# Patient Record
Sex: FEMALE | Race: WHITE | Employment: FULL TIME | URBAN - METROPOLITAN AREA
[De-identification: names, ages, dates, MRNs, and addresses within clinical notes are randomized per-mention and may not be internally consistent; named-entity substitution may affect disease eponyms.]

---

## 2017-12-12 ENCOUNTER — HOSPITAL ENCOUNTER (OUTPATIENT)
Dept: LAB | Age: 42
Discharge: HOME | End: 2017-12-12
Payer: COMMERCIAL

## 2017-12-12 PROCEDURE — 87624 HPV HI-RISK TYP POOLED RSLT: CPT | Performed by: PHYSICIAN ASSISTANT

## 2017-12-12 PROCEDURE — 88175 CYTOPATH C/V AUTO FLUID REDO: CPT | Performed by: PHYSICIAN ASSISTANT

## 2017-12-28 ENCOUNTER — HOSPITAL ENCOUNTER (OUTPATIENT)
Dept: MAMMOGRAPHY | Age: 42
Discharge: HOME | End: 2017-12-28
Attending: FAMILY MEDICINE
Payer: COMMERCIAL

## 2017-12-28 ENCOUNTER — HOSPITAL ENCOUNTER (OUTPATIENT)
Dept: ULTRASOUND IMAGING | Age: 42
Discharge: HOME | End: 2017-12-28
Attending: FAMILY MEDICINE
Payer: COMMERCIAL

## 2017-12-28 DIAGNOSIS — N60.49: ICD-10-CM

## 2017-12-28 DIAGNOSIS — Z12.39 ENCOUNTER FOR OTHER SCREENING FOR MALIGNANT NEOPLASM OF BREAST: ICD-10-CM

## 2017-12-28 PROCEDURE — 76642 ULTRASOUND BREAST LIMITED: CPT

## 2017-12-28 PROCEDURE — 77062 BREAST TOMOSYNTHESIS BI: CPT

## 2018-01-16 ENCOUNTER — OFFICE VISIT (OUTPATIENT)
Dept: SURGERY | Age: 43
End: 2018-01-16

## 2018-01-16 VITALS
WEIGHT: 222 LBS | HEIGHT: 64 IN | SYSTOLIC BLOOD PRESSURE: 136 MMHG | BODY MASS INDEX: 37.9 KG/M2 | DIASTOLIC BLOOD PRESSURE: 90 MMHG | TEMPERATURE: 98.3 F | HEART RATE: 73 BPM | RESPIRATION RATE: 16 BRPM | OXYGEN SATURATION: 97 %

## 2018-01-16 DIAGNOSIS — J45.20 MILD INTERMITTENT ASTHMA, UNSPECIFIED WHETHER COMPLICATED: ICD-10-CM

## 2018-01-16 DIAGNOSIS — N63.20 BREAST MASS, LEFT: Primary | ICD-10-CM

## 2018-01-16 DIAGNOSIS — N64.3 GALACTORRHEA IN FEMALE: ICD-10-CM

## 2018-01-16 NOTE — PATIENT INSTRUCTIONS
If you have any questions or concerns about today's appointment, the verbal and/or written instructions you were given for follow up care, please call our office at [phone]. [facility] - [facility]  [address] [place] [address] [address] [place] [address], [facility]    [phone] office  [phone] fax    PATIENT PRE AND POST [address]   [address]  [phone]    Before Surgery Instructions:   1) You must have someone available to drive you to and from your procedure and stay with you for the first 24 hours. 2) It is very important that you have nothing to eat or drink after midnight the night before your surgery. This includes chewing gum or sucking on hard candy. Take only heart, blood pressure and cholesterol medications the morning of surgery with only a sip of water. 3) Please stop taking Plavix 10 days prior to your surgery. Stop taking Coumadin 5 days prior to your surgery. Stop taking all Aspirin or Aspirin containing products 7 days prior to your surgery. Stop taking Advil, Motrin, Aleve, and etc. 3 days prior to your surgery. 4) If you take any diabetic medications please consult with your primary care physician on how to take them on the day of your surgery  5) Please stop all Herbal products 2 weeks prior to your surgery. 6) Please arrive at the hospital 2 hours prior to your surgery, unless you have been otherwise instructed. 7) Patients having an operation on their colon will be given a separate instruction sheet on their Bowel Prep. 8) For any pre-operative work up check in at the main entrance to [facility], and then go to Patient Registration. These studies are done on a walk in basis they are open from 7:00am to 5:00pm Monday through Friday. 9) Please wash your surgical site the morning of your surgery with soap and water.   10) If you are of child bearing age you will have pregnancy test done the morning of your surgery as soon as you arrive. 11) You may be contacted to change your surgery time. At times this is necessary due to equipment or staffing needs. After Surgery Instructions: You will need to be seen in the office for a follow-up visit 7-14 days after your surgery. Please call after you have had the procedure to make this appointment. Unless otherwise instructed, you may remove your outer bandage and shower 48 hours after your surgery. If you develop a fever greater than 101, have any significant drainage, bleeding, swelling and/or pus of the wound. Please call our office immediately. Surgery Date and Time:                                                                      Please check in at [facility], enter through the Emergency Room entrance and go up to the second floor. Please check in by         the day of your surgery. You may contact Jyoti Miranda with any questions at [phone].

## 2018-01-16 NOTE — MR AVS SNAPSHOT
[address] [facility] [address] [address] 
[phone] Patient: Anderson Guerra MRN: [id number] :1975 Visit Information Date & Time Provider Department Dept. Phone Encounter #  
 2018  3:00 PM Thomas Hidalgo MD [address] [id number] Upcoming Health Maintenance Date Due Pneumococcal 19-64 Medium Risk (1 of 1 - PPSV23) 1994 DTaP/Tdap/Td series (1 - Tdap) 1996 Influenza Age 5 to Adult 2017 PAP AKA CERVICAL CYTOLOGY 2020 Allergies as of 2018  Review Complete On: 2018 By: Thomas Hidalgo MD  
  
 Severity Noted Reaction Type Reactions Bactrim [Sulfamethoprim Ds]  12/10/2013    Nausea and Vomiting Current Immunizations  Never Reviewed No immunizations on file. Not reviewed this visit You Were Diagnosed With   
  
 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  
 38.11 kg/m 2 2.13 m 2 Preferred Pharmacy Pharmacy Name Phone [address], [address] [place] [address] Ave [phone] Your Updated Medication List  
  
   
This list is accurate as of: 18  4:39 PM.  Always use your most recent med list.  
  
  
  
  
 ALBUTEROL IN Take  by inhalation as needed. calcium 500 mg Tab Take  by mouth. CRANBERRY CONCENTRATE Cap Generic drug:  vitamin c-vitamin e Take  by mouth. HYDROcodone-acetaminophen 5-325 mg per tablet Commonly known as:  Irina Punt Take 1 Tab by mouth every six (6) hours as needed for Pain. ibuprofen 600 mg tablet Commonly known as:  MOTRIN Take 1 Tab by mouth every six (6) hours as needed for Pain.  
  
 levothyroxine 100 mcg tablet Commonly known as:  SYNTHROID Take  by mouth daily (before breakfast). multivitamin tablet Commonly known as:  ONE A DAY Take 1 Tab by mouth daily. NORVASC 5 mg tablet Generic drug:  amLODIPine Take 5 mg by mouth daily. Indications: HYPERTENSION PriLOSEC 20 mg capsule Generic drug:  omeprazole Take 20 mg by mouth daily. Indications: HEARTBURN  
  
 PROBIOTIC 4X 10-15 mg Tbec Generic drug:  B.infantis-B.ani-B.long-B.bifi Take  by mouth. psyllium Powd Commonly known as:  METAMUCIL Take  by mouth. Patient Instructions If you have any questions or concerns about today's appointment, the verbal and/or written instructions you were given for follow up care, please call our office at [phone]. [facility] - [facility] [id number] [place] [address] 
 
[phone] office [phone] fax PATIENT PRE AND POST OPERATIVE INSTRUCTIONS [address] 
[phone] Before Surgery Instructions:  
1) You must have someone available to drive you to and from your procedure and stay with you for the first 24 hours. 2) It is very important that you have nothing to eat or drink after midnight the night before your surgery. This includes chewing gum or sucking on hard candy. Take only heart, blood pressure and cholesterol medications the morning of surgery with only a sip of water. 3) Please stop taking Plavix 10 days prior to your surgery.  Stop taking Coumadin 5 days prior to your surgery. Stop taking all Aspirin or Aspirin containing products 7 days prior to your surgery. Stop taking Advil, Motrin, Aleve, and etc. 3 days prior to your surgery. 4) If you take any diabetic medications please consult with your primary care physician on how to take them on the day of your surgery 5) Please stop all Herbal products 2 weeks prior to your surgery. 6) Please arrive at the hospital 2 hours prior to your surgery, unless you have been otherwise instructed. 7) Patients having an operation on their colon will be given a separate instruction sheet on their Bowel Prep. 8) For any pre-operative work up check in at the main entrance to [facility], and then go to Patient Registration. These studies are done on a walk in basis they are open from 7:00am to 5:00pm Monday through Friday. 9) Please wash your surgical site the morning of your surgery with soap and water. 10) If you are of child bearing age you will have pregnancy test done the morning of your surgery as soon as you arrive. 11) You may be contacted to change your surgery time. At times this is necessary due to equipment or staffing needs. After Surgery Instructions: You will need to be seen in the office for a follow-up visit 7-14 days after your surgery. Please call after you have had the procedure to make this appointment. Unless otherwise instructed, you may remove your outer bandage and shower 48 hours after your surgery. If you develop a fever greater than 101, have any significant drainage, bleeding, swelling and/or pus of the wound. Please call our office immediately. Surgery Date and Time:                                                                   
 
Please check in at [facility], enter through the Emergency Room entrance and go up to the second floor. Please check in by         the day of your surgery. You may contact Sada Beauchamp with any questions at [phone]. Introducing \[facility] [place] [facility]\"" & HEALTH SERVICES! Diana Lim introduces [employer] patient portal. Now you can access parts of your medical record, email your doctor's office, and request medication refills online. 1. In your internet browser, go to [email or web address] 2. Click on the First Time User? Click Here link in the Sign In box. You will see the New Member Sign Up page. 3. Enter your [employer] Access Code exactly as it appears below. You will not need to use this code after youve completed the sign-up process. If you do not sign up before the expiration date, you must request a new code. · [employer] Access Code: [email or web address] Expires: 3/13/2018 12:47 PM 
 
4. Enter the last four digits of your Social Security Number (xxxx) and Date of Birth (mm/dd/yyyy) as indicated and click Submit. You will be taken to the next sign-up page. 5. Create a [employer] ID. This will be your [employer] login ID and cannot be changed, so think of one that is secure and easy to remember. 6. Create a [employer] password. You can change your password at any time. 7. Enter your Password Reset Question and Answer. This can be used at a later time if you forget your password. 8. Enter your e-mail address. You will receive e-mail notification when new information is available in [address] 9. Click Sign Up. You can now view and download portions of your medical record. 10. Click the Download Summary menu link to download a portable copy of your medical information. If you have questions, please visit the Frequently Asked Questions section of the [employer] website. Remember, [employer] is NOT to be used for urgent needs. For medical emergencies, dial 911. Now available from your iPhone and Android! Please provide this summary of care documentation to your next provider. Your primary care clinician is listed as Gerry Barkley.  If you have any questions after today's visit, please call [phone].

## 2018-01-16 NOTE — PROGRESS NOTES
Daniel Cast is a 43 y.o. female who presents for a surgical evaluation of a painful left breast lump. Last mammogram: 17    Breast pain? Yes, left breast    Do you do self breast exams? Yes    Do you feel any abnormal areas on your breast(s)? Yes, left breast lump    Do you have any nipple discharge/drainage? Color? Yes, bilateral nipples- milky, cloudy    Any discoloration of the skin on/around breast? No    Consume caffeine? Yes                                           How much? 20 oz daily    Menarche age: 15 years    When was your last menstrual cycle? 2013    Start of menopause? Yes    Birth control:  Yes                       How long? 2 years                                            Type: BC pills, Nuva ring    : 0                         Para:  0                          Abortions: 0    Age of first pregnancy: N/A    Breast feed? N/A                        Months total: N/A    Fam hx of breast ca? No                       Relation:  N/A                            Age dx: N/A    Fam: hx of ovarian ca? No                     Relation:  N/A                            Age dx: N/A    Pt hx or breast or ovarian ca? No                                     Age of dx?  N/A

## 2018-01-17 NOTE — PROGRESS NOTES
History of present illness    Sirisha Dickinson came to the office today for evaluation of a recently found breast mass on mammogram.  The patient had been complaining of some discomfort in the subareolar area of the left breast.  A few weeks ago she had developed a large swollen tender area around the nipple areolar area of the left breast.  She was placed on Keflex she believes and the redness and tenderness totally subsided at that time. A recent mammogram was ordered. Nothing was seen in the area of tenderness. Some slight ductal dilatation was noted. The patient states that for many years she has had bilateral clear discharge from both nipples. She notices that she develops a tightness or full feeling in the nipple areolar areas and squeezes and then notices drainage which then relieves some of the pressure she experiences in the nipple. She does this almost every day. Incidentally on the mammogram and ultrasound she recently had there did appear to be at the 3 o'clock position of the left breast about 7 cm from the nipple a 1.7 cm solid mass that looked suggestive of a fibroadenoma. The patient has not had any significant previous breast problems other than as mentioned above. There is no family history of breast or ovarian cancer but the patient's mother was adopted so she does not know that family history. Patient had a hysterectomy in 1772 and is uncertain whether or not her ovaries were removed but apparently she had a fibroid. Patient's menarche has started the at the age of 15. She is  0 and took birth control pills for about 2 years.     Allergies   Allergen Reactions    Bactrim [Sulfamethoprim Ds] Nausea and Vomiting     Past Medical History:   Diagnosis Date    Abnormal uterine bleeding     Abscess     skin abcesses    Asthma     Chronic cholecystitis 2010    Dysmenorrhea 2013    Endocrine disease     hypothyroidism    Fibroids 2013    Gastrointestinal disorder     acid reflux    Hepatitis     Hypertension 10/2013    Menometrorrhagia 8/14/2013    Pelvic pain in female 8/14/2013    Pre-op evaluation 12/9/2013    S/P laparoscopic hysterectomy 2/21/2014    Thyroid disorder      Past Surgical History:   Procedure Laterality Date    HX CHOLECYSTECTOMY  02/23/2010    with intraoperative cholangiogram    HX HYSTERECTOMY       Social History     Social History    Marital status: SINGLE     Spouse name: N/A    Number of children: N/A    Years of education: N/A     Social History Main Topics    Smoking status: Never Smoker    Smokeless tobacco: Never Used    Alcohol use No    Drug use: No    Sexual activity: Yes     Partners: Male     Birth control/ protection: None     Other Topics Concern    None     Social History Narrative     REVIEW OF SYSTEMS     Constitutional: No fever, weight loss, fatigue or recent chills. Skin:  No recent rashes, dermatitis or abnormal moles. HEENT:  No changes in vision, vertigo, epistaxis, dysphasia, or hoarseness. Cardiac:  No chest pain, palpitations, or edema. History of hypertension     Respiratory: No chronic cough, shortness of breath, wheezing, hemoptysis, or history of sleep apnea. History of asthma     Breasts/GYN:  No history of recent breast lumps or nipple discharge. Mammograms are up to date. No GYN-type problems. See the history of present illness     Gastrointestinal:  No significant food intolerances, no recent vomiting, no chronic abdominal pain, no change in bowel habits, no melena. History of GERD. Genitourinary:  No history of hematuria, dysuria, frequency, or stress urinary incontinence. No nocturia. Musculoskeletal: No weakness, joint pains, or arthritis. Endocrine:  History of thyroid disease. No diabetes. Lymph/hemo:  No history of blood transfusions or easy bruising. No anemia. Neuro: No dizziness or headaches or fainting.     PHYSICAL EXAM    Visit Vitals    /90 (BP 1 Location: Left arm, BP Patient Position: Sitting)    Pulse 73    Temp 98.3 °F (36.8 °C) (Oral)    Resp 16    Ht 5' 4\" (1.626 m)    Wt 100.7 kg (222 lb)    LMP 11/25/2013    SpO2 97%    BMI 38.11 kg/m2          Constitutional:  Well-developed, well-nourished, no acute distress. Head:  Head, eyes, ears, nose, throat within normal limits. Skin:  No suspicious moles or rashes. Neck:  No masses or adenopathy. The airway appears normal. Thyroid is not enlarged and there are no palpable thyroid nodules. Lungs:  Lungs are clear to auscultation and percussion. No respiratory distress. No chest wall tenderness. Heart:  Heart is regular with no extra heart sounds or murmur heard. Breast Exam: The breasts are free of any discrete tenderness or masses except just left of the nipple areolar complex at 3:00 for the left breast.  The skin and nipple areas appear normal. Both axillae are negative. I can feel a slightly tender about 2 cm mass that is poorly defined about 7 cm from the nipple in the 3 o'clock position of the left breast.     Abdomen: The abdomen is soft and nontender without organomegaly or masses. Bowel sounds are active and of normal pitch. There is no abdominal distention. No hernias are evident. Extremities:  No tenderness of the extremities and no significant swelling. Psych:  Alert and oriented. Assessment: #1 recent cellulitis/mastitis left breast which resolved with Keflex. #2 slightly tender palpable mass 3 o'clock position left breast 7 cm from the nipple measuring about 1-2 cm. #3 hypertension #4 GERD. Recommendations: We discussed the options for treatment with the patient including observation versus biopsy versus excision of the mass which is probably a fibroadenoma. The patient stated that because she was having some tenderness and because other masses she has had have grown in the past she wants to go ahead and have this removed.   At the present time I do not feel we need to do a biopsy before proceeding with excision. The above was dictated with Edi. There may be unrecognized errors.     Raul Nguyen MD

## 2018-01-17 NOTE — COMMUNICATION BODY
Dear Slava Roll came to the office today for evaluation regarding the recent tenderness in her left breast in the mass seen on recent mammogram.    The 1.7 cm mass seen on ultrasound appears benign and on examination is slightly tender and certainly could be consistent with a fibroadenoma. She has no evidence of infection otherwise. She has decided that she wants to go ahead and have this area excised since it is tender and she is concerned about it getting larger. Thank you again for allowing me to work with you in her care.     With kindest regards,    Theoplis Aschoff MD

## 2018-01-22 ENCOUNTER — ANESTHESIA EVENT (OUTPATIENT)
Dept: SURGERY | Age: 43
End: 2018-01-22
Payer: COMMERCIAL

## 2018-01-23 ENCOUNTER — HOSPITAL ENCOUNTER (OUTPATIENT)
Age: 43
Setting detail: OUTPATIENT SURGERY
Discharge: HOME | End: 2018-01-23
Attending: SPECIALIST | Admitting: SPECIALIST
Payer: COMMERCIAL

## 2018-01-23 ENCOUNTER — ANESTHESIA (OUTPATIENT)
Dept: SURGERY | Age: 43
End: 2018-01-23
Payer: COMMERCIAL

## 2018-01-23 VITALS
OXYGEN SATURATION: 93 % | WEIGHT: 214 LBS | TEMPERATURE: 98.3 F | HEART RATE: 88 BPM | HEIGHT: 65 IN | BODY MASS INDEX: 35.65 KG/M2 | RESPIRATION RATE: 16 BRPM | SYSTOLIC BLOOD PRESSURE: 151 MMHG | DIASTOLIC BLOOD PRESSURE: 94 MMHG

## 2018-01-23 DIAGNOSIS — N63.20 BREAST MASS, LEFT: Primary | ICD-10-CM

## 2018-01-23 PROCEDURE — 74011000250 HC RX REV CODE- 250

## 2018-01-23 PROCEDURE — 77030010938 HC CLP LIG TELE -A: Performed by: SPECIALIST

## 2018-01-23 PROCEDURE — 74011000250 HC RX REV CODE- 250: Performed by: SPECIALIST

## 2018-01-23 PROCEDURE — 74011250636 HC RX REV CODE- 250/636

## 2018-01-23 PROCEDURE — 77030031139 HC SUT VCRL2 J&J -A: Performed by: SPECIALIST

## 2018-01-23 PROCEDURE — 74011250636 HC RX REV CODE- 250/636: Performed by: SPECIALIST

## 2018-01-23 PROCEDURE — 77030013079 HC BLNKT BAIR HGGR 3M -A: Performed by: ANESTHESIOLOGY

## 2018-01-23 PROCEDURE — 74011250637 HC RX REV CODE- 250/637: Performed by: NURSE ANESTHETIST, CERTIFIED REGISTERED

## 2018-01-23 PROCEDURE — 74011250637 HC RX REV CODE- 250/637

## 2018-01-23 PROCEDURE — 77030032490 HC SLV COMPR SCD KNE COVD -B: Performed by: SPECIALIST

## 2018-01-23 PROCEDURE — 77030002986 HC SUT PROL J&J -A: Performed by: SPECIALIST

## 2018-01-23 PROCEDURE — 76210000021 HC REC RM PH II 0.5 TO 1 HR: Performed by: SPECIALIST

## 2018-01-23 PROCEDURE — 77030011267 HC ELECTRD BLD COVD -A: Performed by: SPECIALIST

## 2018-01-23 PROCEDURE — 77030003028 HC SUT VCRL J&J -A: Performed by: SPECIALIST

## 2018-01-23 PROCEDURE — 77030010516 HC APPL HEMA CLP TELE -B: Performed by: SPECIALIST

## 2018-01-23 PROCEDURE — 77030011640 HC PAD GRND REM COVD -A: Performed by: SPECIALIST

## 2018-01-23 PROCEDURE — 77030012510 HC MSK AIRWY LMA TELE -B: Performed by: ANESTHESIOLOGY

## 2018-01-23 PROCEDURE — 77030008462 HC STPLR SKN PROX J&J -A: Performed by: SPECIALIST

## 2018-01-23 PROCEDURE — 76060000032 HC ANESTHESIA 0.5 TO 1 HR: Performed by: SPECIALIST

## 2018-01-23 PROCEDURE — 74011250636 HC RX REV CODE- 250/636: Performed by: NURSE ANESTHETIST, CERTIFIED REGISTERED

## 2018-01-23 PROCEDURE — 76010000160 HC OR TIME 0.5 TO 1 HR INTENSV-TIER 1: Performed by: SPECIALIST

## 2018-01-23 PROCEDURE — 77030018836 HC SOL IRR NACL ICUM -A: Performed by: SPECIALIST

## 2018-01-23 PROCEDURE — 77030002916 HC SUT ETHLN J&J -A: Performed by: SPECIALIST

## 2018-01-23 PROCEDURE — 76210000006 HC OR PH I REC 0.5 TO 1 HR: Performed by: SPECIALIST

## 2018-01-23 PROCEDURE — 88305 TISSUE EXAM BY PATHOLOGIST: CPT | Performed by: SPECIALIST

## 2018-01-23 PROCEDURE — 74011000272 HC RX REV CODE- 272: Performed by: SPECIALIST

## 2018-01-23 RX ORDER — MIDAZOLAM HYDROCHLORIDE 1 MG/ML
INJECTION, SOLUTION INTRAMUSCULAR; INTRAVENOUS AS NEEDED
Status: DISCONTINUED | OUTPATIENT
Start: 2018-01-23 | End: 2018-01-23 | Stop reason: HOSPADM

## 2018-01-23 RX ORDER — PROPOFOL 10 MG/ML
INJECTION, EMULSION INTRAVENOUS AS NEEDED
Status: DISCONTINUED | OUTPATIENT
Start: 2018-01-23 | End: 2018-01-23 | Stop reason: HOSPADM

## 2018-01-23 RX ORDER — SODIUM CHLORIDE 0.9 % (FLUSH) 0.9 %
5-10 SYRINGE (ML) INJECTION AS NEEDED
Status: DISCONTINUED | OUTPATIENT
Start: 2018-01-23 | End: 2018-01-23 | Stop reason: HOSPADM

## 2018-01-23 RX ORDER — CEFAZOLIN SODIUM 2 G/50ML
SOLUTION INTRAVENOUS
Status: COMPLETED
Start: 2018-01-23 | End: 2018-01-23

## 2018-01-23 RX ORDER — DEXAMETHASONE SODIUM PHOSPHATE 4 MG/ML
INJECTION, SOLUTION INTRA-ARTICULAR; INTRALESIONAL; INTRAMUSCULAR; INTRAVENOUS; SOFT TISSUE AS NEEDED
Status: DISCONTINUED | OUTPATIENT
Start: 2018-01-23 | End: 2018-01-23 | Stop reason: HOSPADM

## 2018-01-23 RX ORDER — FAMOTIDINE 20 MG/1
20 TABLET, FILM COATED ORAL ONCE
Status: COMPLETED | OUTPATIENT
Start: 2018-01-23 | End: 2018-01-23

## 2018-01-23 RX ORDER — SODIUM CHLORIDE, SODIUM LACTATE, POTASSIUM CHLORIDE, CALCIUM CHLORIDE 600; 310; 30; 20 MG/100ML; MG/100ML; MG/100ML; MG/100ML
100 INJECTION, SOLUTION INTRAVENOUS CONTINUOUS
Status: DISCONTINUED | OUTPATIENT
Start: 2018-01-23 | End: 2018-01-23 | Stop reason: HOSPADM

## 2018-01-23 RX ORDER — ONDANSETRON 2 MG/ML
4 INJECTION INTRAMUSCULAR; INTRAVENOUS ONCE
Status: DISCONTINUED | OUTPATIENT
Start: 2018-01-23 | End: 2018-01-23 | Stop reason: HOSPADM

## 2018-01-23 RX ORDER — ACETAMINOPHEN AND CODEINE PHOSPHATE 300; 30 MG/1; MG/1
1-2 TABLET ORAL
Qty: 20 TAB | Refills: 0 | Status: SHIPPED | OUTPATIENT
Start: 2018-01-23 | End: 2018-01-28

## 2018-01-23 RX ORDER — FENTANYL CITRATE 50 UG/ML
50 INJECTION, SOLUTION INTRAMUSCULAR; INTRAVENOUS
Status: DISCONTINUED | OUTPATIENT
Start: 2018-01-23 | End: 2018-01-23 | Stop reason: HOSPADM

## 2018-01-23 RX ORDER — FAMOTIDINE 20 MG/1
TABLET, FILM COATED ORAL
Status: COMPLETED
Start: 2018-01-23 | End: 2018-01-23

## 2018-01-23 RX ORDER — CEFAZOLIN SODIUM 2 G/50ML
2 SOLUTION INTRAVENOUS ONCE
Status: COMPLETED | OUTPATIENT
Start: 2018-01-23 | End: 2018-01-23

## 2018-01-23 RX ORDER — LIDOCAINE HYDROCHLORIDE 20 MG/ML
INJECTION, SOLUTION EPIDURAL; INFILTRATION; INTRACAUDAL; PERINEURAL AS NEEDED
Status: DISCONTINUED | OUTPATIENT
Start: 2018-01-23 | End: 2018-01-23 | Stop reason: HOSPADM

## 2018-01-23 RX ORDER — SODIUM CHLORIDE 0.9 % (FLUSH) 0.9 %
5-10 SYRINGE (ML) INJECTION EVERY 8 HOURS
Status: DISCONTINUED | OUTPATIENT
Start: 2018-01-23 | End: 2018-01-23 | Stop reason: HOSPADM

## 2018-01-23 RX ORDER — SODIUM CHLORIDE, SODIUM LACTATE, POTASSIUM CHLORIDE, CALCIUM CHLORIDE 600; 310; 30; 20 MG/100ML; MG/100ML; MG/100ML; MG/100ML
INJECTION, SOLUTION INTRAVENOUS
Status: DISCONTINUED | OUTPATIENT
Start: 2018-01-23 | End: 2018-01-23 | Stop reason: HOSPADM

## 2018-01-23 RX ORDER — FENTANYL CITRATE 50 UG/ML
INJECTION, SOLUTION INTRAMUSCULAR; INTRAVENOUS AS NEEDED
Status: DISCONTINUED | OUTPATIENT
Start: 2018-01-23 | End: 2018-01-23 | Stop reason: HOSPADM

## 2018-01-23 RX ORDER — SODIUM CHLORIDE, SODIUM LACTATE, POTASSIUM CHLORIDE, CALCIUM CHLORIDE 600; 310; 30; 20 MG/100ML; MG/100ML; MG/100ML; MG/100ML
75 INJECTION, SOLUTION INTRAVENOUS CONTINUOUS
Status: DISCONTINUED | OUTPATIENT
Start: 2018-01-23 | End: 2018-01-23 | Stop reason: HOSPADM

## 2018-01-23 RX ORDER — BUPIVACAINE HYDROCHLORIDE AND EPINEPHRINE 5; 5 MG/ML; UG/ML
30 INJECTION, SOLUTION EPIDURAL; INTRACAUDAL; PERINEURAL ONCE
Status: DISCONTINUED | OUTPATIENT
Start: 2018-01-23 | End: 2018-01-23 | Stop reason: HOSPADM

## 2018-01-23 RX ORDER — DIPHENHYDRAMINE HYDROCHLORIDE 50 MG/ML
12.5 INJECTION, SOLUTION INTRAMUSCULAR; INTRAVENOUS
Status: DISCONTINUED | OUTPATIENT
Start: 2018-01-23 | End: 2018-01-23 | Stop reason: HOSPADM

## 2018-01-23 RX ORDER — OXYCODONE HYDROCHLORIDE 5 MG/1
5 TABLET ORAL
Status: COMPLETED | OUTPATIENT
Start: 2018-01-23 | End: 2018-01-23

## 2018-01-23 RX ORDER — NALOXONE HYDROCHLORIDE 0.4 MG/ML
0.1 INJECTION, SOLUTION INTRAMUSCULAR; INTRAVENOUS; SUBCUTANEOUS AS NEEDED
Status: DISCONTINUED | OUTPATIENT
Start: 2018-01-23 | End: 2018-01-23 | Stop reason: HOSPADM

## 2018-01-23 RX ADMIN — FENTANYL CITRATE 100 MCG: 50 INJECTION, SOLUTION INTRAMUSCULAR; INTRAVENOUS at 07:40

## 2018-01-23 RX ADMIN — CEFAZOLIN SODIUM 2 G: 2 SOLUTION INTRAVENOUS at 07:37

## 2018-01-23 RX ADMIN — SODIUM CHLORIDE, SODIUM LACTATE, POTASSIUM CHLORIDE, AND CALCIUM CHLORIDE 100 ML/HR: 600; 310; 30; 20 INJECTION, SOLUTION INTRAVENOUS at 06:43

## 2018-01-23 RX ADMIN — PROPOFOL 150 MG: 10 INJECTION, EMULSION INTRAVENOUS at 07:40

## 2018-01-23 RX ADMIN — FENTANYL CITRATE 100 MCG: 50 INJECTION, SOLUTION INTRAMUSCULAR; INTRAVENOUS at 07:35

## 2018-01-23 RX ADMIN — OXYCODONE HYDROCHLORIDE 5 MG: 5 TABLET ORAL at 09:32

## 2018-01-23 RX ADMIN — FAMOTIDINE 20 MG: 20 TABLET, FILM COATED ORAL at 06:50

## 2018-01-23 RX ADMIN — LIDOCAINE HYDROCHLORIDE 80 MG: 20 INJECTION, SOLUTION EPIDURAL; INFILTRATION; INTRACAUDAL; PERINEURAL at 07:40

## 2018-01-23 RX ADMIN — DEXAMETHASONE SODIUM PHOSPHATE 4 MG: 4 INJECTION, SOLUTION INTRA-ARTICULAR; INTRALESIONAL; INTRAMUSCULAR; INTRAVENOUS; SOFT TISSUE at 07:46

## 2018-01-23 RX ADMIN — SODIUM CHLORIDE, SODIUM LACTATE, POTASSIUM CHLORIDE, CALCIUM CHLORIDE: 600; 310; 30; 20 INJECTION, SOLUTION INTRAVENOUS at 07:35

## 2018-01-23 RX ADMIN — MIDAZOLAM HYDROCHLORIDE 2 MG: 1 INJECTION, SOLUTION INTRAMUSCULAR; INTRAVENOUS at 07:35

## 2018-01-23 NOTE — ANESTHESIA POSTPROCEDURE EVALUATION
Post-Anesthesia Evaluation and Assessment    Patient: Hardik Mayen MRN: [id number]  SSN: [id number]    YOB: 1975  Age: 43 y.o. Sex: female      Data from PACU flowsheet    Cardiovascular Function/Vital Signs  Visit Vitals    BP (!) 150/97    Pulse 82    Temp 36.8 °C (98.3 °F)    Resp 18    Ht 5' 5\" (1.651 m)    Wt 97.1 kg (214 lb)    SpO2 93%    BMI 35.61 kg/m2       Patient is status post general anesthesia for Procedure(s):  LEFT BREAST MASS EXCISION. Nausea/Vomiting: controlled    Postoperative hydration reviewed and adequate. Pain:  Pain Scale 1: Visual (01/23/18 2956)  Pain Intensity 1: 3 (01/23/18 6337)   Managed      Mental Status and Level of Consciousness: Alert and oriented     Pulmonary Status:   O2 Device: Room air (01/23/18 0880)   Adequate oxygenation and airway patent    Complications related to anesthesia: None    Post-anesthesia assessment completed.  No concerns    Signed By: Carmencita Burk MD     January 23, 2018

## 2018-01-23 NOTE — BRIEF OP NOTE
BRIEF OPERATIVE NOTE    Date of Procedure: 1/23/2018   Preoperative Diagnosis: left breast mass n63.20 3:00-7cm  Postoperative Diagnosis: saqme  Procedure(s):  LEFT BREAST MASS EXCISION  Surgeon(s) and Role:     * Josy Wayne MD - Primary         Assistant Staff:       Surgical Staff:  Circ-1: Shivani Benjamin RN  Scrub Tech-1: Jennifer Singleton  Scrub Tech-2: Quiana Flower  Surg Asst-1: Juan Landry  Surg Asst-2: Jenn Rincon  Event Time In   Incision Start 0750   Incision Close 0815     Anesthesia: General LMA  Estimated Blood Loss: 5ml  Specimens:   ID Type Source Tests Collected by Time Destination   1 : Left breast mass Preservative Breast  Josy Wayne MD 1/23/2018 5170 Pathology      Findings: solid mass   Complications: none  Implants: * No implants in log *

## 2018-01-23 NOTE — ANESTHESIA PREPROCEDURE EVALUATION
Anesthetic History   No history of anesthetic complications            Review of Systems / Medical History  Patient summary reviewed and pertinent labs reviewed    Pulmonary            Asthma : well controlled       Neuro/Psych   Within defined limits           Cardiovascular    Hypertension: well controlled                   GI/Hepatic/Renal           Liver disease     Endo/Other      Hypothyroidism: well controlled  Obesity     Other Findings   Comments: Documentation of current medication  Current medications obtained, documented and obtained? YES      Risk Factors for Postoperative nausea/vomiting:       History of postoperative nausea/vomiting? NO       Female? YES       Motion sickness? NO       Intended opioid administration for postoperative analgesia? NO      Smoking Abstinence:  Current Smoker? NO  Elective Surgery? YES  Seen preoperatively by anesthesiologist or proxy prior to day of surgery? YES  Pt abstained from smoking 24 hours prior to anesthesia?  N/A    Preventive care/screening for High Blood Pressure:  Aged 18 years and older: YES  Screened for high blood pressure: YES  Patients with high blood pressure referred to primary care provider   for BP management: YES                 Physical Exam    Airway  Mallampati: II  TM Distance: 4 - 6 cm  Neck ROM: normal range of motion   Mouth opening: Normal     Cardiovascular  Regular rate and rhythm,  S1 and S2 normal,  no murmur, click, rub, or gallop             Dental    Dentition: Poor dentition     Pulmonary                 Abdominal  GI exam deferred       Other Findings            Anesthetic Plan    ASA: 3  Anesthesia type: general          Induction: Intravenous  Anesthetic plan and risks discussed with: Patient

## 2018-01-23 NOTE — PERIOP NOTES
5893 pt arrived from OR, bedside report recvd, opportunity to answer question, pt placed on monitor.  VSS, NAD

## 2018-01-23 NOTE — OP NOTES
[address] REPORT    Eric Lundberg  MR#: [id number]  : 1975  ACCOUNT #: [de-identified]   DATE OF SERVICE: 2018    PREOPERATIVE DIAGNOSIS:  Left breast mass 3 o'clock position 7 cm from nipple, probable fibroadenoma. POSTOPERATIVE DIAGNOSIS:  Left breast mass 3 o'clock position 7 cm from nipple, probable fibroadenoma. Await pathology report. PROCEDURE PERFORMED:  Excisional left breast biopsy. SURGEON:  Alex Andersen MD.     ASSISTANT:  SA Avril Vergara    ANESTHESIA:  General LMA plus 0.5% Marcaine with epinephrine 6 mL. ESTIMATED BLOOD LOSS:  Less than 5 mL. SPECIMEN REMOVED:  Well circumscribed mass left breast 3 o'clock position. COMPLICATIONS: None    IMPLANTS: None    OPERATIVE FINDINGS:  The patient is a 40-year-old female who complained of some pain or soreness in the left breast and felt a \"lump\" in about the 1 o'clock position near the edge of the areola. This was slightly tender and on mammogram and ultrasound, nothing was seen in that area. However, in the 3 o'clock position 7 cm from the nipple, she was noted to have about a 1.4 cm mass that appeared benign consistent with a possible lipoma. The patient wanted to proceed with excision of this mass and she was told that removing that would not necessarily improve the tenderness that she is having, which is probably from fibrocystic changes. OPERATIVE PROCEDURE:  The patient was taken to the operating room and placed under general anesthesia. The left breast was prepped and draped in appropriate manner. Timeout was performed. We had previously marked the area of the palpable mass. The skin was injected with Marcaine and a small transverse incision was made measuring about 3 cm.   Then, we gently dissected down into the subcutaneous tissue to the breast tissue, we had some difficulty finding the mass on palpation, but with persistent gentle palpation and gentle dissection, we were able to find the mass and excised it intact. There was good hemostasis. The wound was irrigated. The breast tissue was closed with interrupted 3-0 Vicryls in the deeper layer and the more superficial layer and some subdermal stitches were placed and the skin then closed with subcuticular 4-0 Prolene stitch. Steri-Strips applied, sterile dressings and the patient awakened and taken to recovery in satisfactory condition.       MD BINDU Lugo / CRYSTAL  D: 01/23/2018 08:13     T: 01/23/2018 09:38  JOB #: [id number]

## 2018-01-23 NOTE — INTERVAL H&P NOTE
H&P Update:  Dawood Rios was seen and examined. History and physical has been reviewed. The patient has been examined.  There have been no significant clinical changes since the completion of the originally dated History and Physical.    Signed By: Vernell Mccallum MD     January 23, 2018 7:19 AM

## 2018-01-23 NOTE — H&P (VIEW-ONLY)
History of present illness    Dakota Irving came to the office today for evaluation of a recently found breast mass on mammogram.  The patient had been complaining of some discomfort in the subareolar area of the left breast.  A few weeks ago she had developed a large swollen tender area around the nipple areolar area of the left breast.  She was placed on Keflex she believes and the redness and tenderness totally subsided at that time. A recent mammogram was ordered. Nothing was seen in the area of tenderness. Some slight ductal dilatation was noted. The patient states that for many years she has had bilateral clear discharge from both nipples. She notices that she develops a tightness or full feeling in the nipple areolar areas and squeezes and then notices drainage which then relieves some of the pressure she experiences in the nipple. She does this almost every day. Incidentally on the mammogram and ultrasound she recently had there did appear to be at the 3 o'clock position of the left breast about 7 cm from the nipple a 1.7 cm solid mass that looked suggestive of a fibroadenoma. The patient has not had any significant previous breast problems other than as mentioned above. There is no family history of breast or ovarian cancer but the patient's mother was adopted so she does not know that family history. Patient had a hysterectomy in  and is uncertain whether or not her ovaries were removed but apparently she had a fibroid. Patient's menarche has started the at the age of 15. She is  0 and took birth control pills for about 2 years.     Allergies   Allergen Reactions    Bactrim [Sulfamethoprim Ds] Nausea and Vomiting     Past Medical History:   Diagnosis Date    Abnormal uterine bleeding     Abscess     skin abcesses    Asthma     Chronic cholecystitis 2010    Dysmenorrhea 2013    Endocrine disease     hypothyroidism    Fibroids 2013    Gastrointestinal disorder     acid reflux    Hepatitis     Hypertension 10/2013    Menometrorrhagia 8/14/2013    Pelvic pain in female 8/14/2013    Pre-op evaluation 12/9/2013    S/P laparoscopic hysterectomy 2/21/2014    Thyroid disorder      Past Surgical History:   Procedure Laterality Date    HX CHOLECYSTECTOMY  02/23/2010    with intraoperative cholangiogram    HX HYSTERECTOMY       Social History     Social History    Marital status: SINGLE     Spouse name: N/A    Number of children: N/A    Years of education: N/A     Social History Main Topics    Smoking status: Never Smoker    Smokeless tobacco: Never Used    Alcohol use No    Drug use: No    Sexual activity: Yes     Partners: Male     Birth control/ protection: None     Other Topics Concern    None     Social History Narrative     REVIEW OF SYSTEMS     Constitutional: No fever, weight loss, fatigue or recent chills. Skin:  No recent rashes, dermatitis or abnormal moles. HEENT:  No changes in vision, vertigo, epistaxis, dysphasia, or hoarseness. Cardiac:  No chest pain, palpitations, or edema. History of hypertension     Respiratory: No chronic cough, shortness of breath, wheezing, hemoptysis, or history of sleep apnea. History of asthma     Breasts/GYN:  No history of recent breast lumps or nipple discharge. Mammograms are up to date. No GYN-type problems. See the history of present illness     Gastrointestinal:  No significant food intolerances, no recent vomiting, no chronic abdominal pain, no change in bowel habits, no melena. History of GERD. Genitourinary:  No history of hematuria, dysuria, frequency, or stress urinary incontinence. No nocturia. Musculoskeletal: No weakness, joint pains, or arthritis. Endocrine:  History of thyroid disease. No diabetes. Lymph/hemo:  No history of blood transfusions or easy bruising. No anemia. Neuro: No dizziness or headaches or fainting.     PHYSICAL EXAM    Visit Vitals    /90 (BP 1 Location: Left arm, BP Patient Position: Sitting)    Pulse 73    Temp 98.3 °F (36.8 °C) (Oral)    Resp 16    Ht 5' 4\" (1.626 m)    Wt 100.7 kg (222 lb)    LMP 11/25/2013    SpO2 97%    BMI 38.11 kg/m2          Constitutional:  Well-developed, well-nourished, no acute distress. Head:  Head, eyes, ears, nose, throat within normal limits. Skin:  No suspicious moles or rashes. Neck:  No masses or adenopathy. The airway appears normal. Thyroid is not enlarged and there are no palpable thyroid nodules. Lungs:  Lungs are clear to auscultation and percussion. No respiratory distress. No chest wall tenderness. Heart:  Heart is regular with no extra heart sounds or murmur heard. Breast Exam: The breasts are free of any discrete tenderness or masses except just left of the nipple areolar complex at 3:00 for the left breast.  The skin and nipple areas appear normal. Both axillae are negative. I can feel a slightly tender about 2 cm mass that is poorly defined about 7 cm from the nipple in the 3 o'clock position of the left breast.     Abdomen: The abdomen is soft and nontender without organomegaly or masses. Bowel sounds are active and of normal pitch. There is no abdominal distention. No hernias are evident. Extremities:  No tenderness of the extremities and no significant swelling. Psych:  Alert and oriented. Assessment: #1 recent cellulitis/mastitis left breast which resolved with Keflex. #2 slightly tender palpable mass 3 o'clock position left breast 7 cm from the nipple measuring about 1-2 cm. #3 hypertension #4 GERD. Recommendations: We discussed the options for treatment with the patient including observation versus biopsy versus excision of the mass which is probably a fibroadenoma. The patient stated that because she was having some tenderness and because other masses she has had have grown in the past she wants to go ahead and have this removed.   At the present time I do not feel we need to do a biopsy before proceeding with excision. The above was dictated with Edi. There may be unrecognized errors.     Thomas Hendrix MD

## 2018-01-23 NOTE — ADDENDUM NOTE
Addendum  created 01/23/18 0926 by Ronda Garcia RN    Anesthesia Intra LDAs edited, LDA properties accepted

## 2018-01-23 NOTE — IP AVS SNAPSHOT
[facility] 
 
 
 [address]  
[phone] Patient: Dakota Irving MRN: [id number] :1975 About your hospitalization You were admitted on:  2018 You last received care in the:  [facility] PACU You were discharged on:  2018 Why you were hospitalized Your primary diagnosis was:  Not on File Follow-up Information Follow up With Details Comments Contact Info Priya Ashby, [address]   [address] [facility] 
[address] [facility] [address] 
[phone] Your Scheduled Appointments 2018  2:15 PM EST  
POST OP with Leigh De La Cruz MD  
[address] [facility] [address] [facility] [address] [facility] [address]  
[phone] Discharge Orders Procedure Order Date Status Priority Quantity Spec Type Associated Dx CALL YOUR DOCTOR For: Persistant nausea and vomiting., Redness, tenderness, or signs of infection. , Severe uncontrolled pain. Call for temp greater than 101 18 Normal Routine 1  Breast mass, left [[id number]] Comments:  Call for temp greater than 101 Questions: For:  Persistant nausea and vomiting. For:  Redness, tenderness, or signs of infection. For:  Severe uncontrolled pain. ACTIVITY AFTER DISCHARGE Patient should: Restrict lifting, Shower on day of dressing removal(no baths). 18 Normal Routine 1  Breast mass, left [[id number]] Questions: Patient should:  Restrict lifting Patient should: Shower on day of dressing removal(no baths). DIET REGULAR 18 Normal Routine 1  Breast mass, left [[id number]] DRESSING, REMOVE IN 48 HOURS 18 Normal Routine 1  Breast mass, left [[id number]] Comments:  Remove dressing in 48 hours. Leave steri strips if present. A check mira indicates which time of day the medication should be taken. My Medications START taking these medications Instructions Each Dose to Equal  
 Morning Noon Evening Bedtime  
 acetaminophen-codeine 300-30 mg per tablet Commonly known as:  TYLENOL-CODEINE #3 Your last dose was: Your next dose is: Take 1-2 Tabs by mouth every four (4) hours as needed for Pain for up to 5 days. Max Daily Amount: 12 Tabs. 1-2 Tab CONTINUE taking these medications Instructions Each Dose to Equal  
 Morning Noon Evening Bedtime ALBUTEROL IN Your last dose was: Your next dose is: Take  by inhalation as needed. levothyroxine 100 mcg tablet Commonly known as:  SYNTHROID Your last dose was: Your next dose is: Take  by mouth daily (before breakfast). NORVASC 5 mg tablet Generic drug:  amLODIPine Your last dose was: Your next dose is: Take 5 mg by mouth daily. Indications: HYPERTENSION  
 5 mg PROBIOTIC 4X 10-15 mg Tbec Generic drug:  B.infantis-B.ani-B.long-B.bifi Your last dose was: Your next dose is: Take  by mouth two (2) times a day. psyllium Powd Commonly known as:  METAMUCIL Your last dose was: Your next dose is: Take  by mouth two (2) times a day. Where to Get Your Medications Information on where to get these meds will be given to you by the nurse or doctor. ! Ask your nurse or doctor about these medications  
  acetaminophen-codeine 300-30 mg per tablet Discharge Instructions [facility] [address] [place] [address], [address] [facility] 
[phone] Dr. Brayden Calhoun' Discharge Instructions Patient: Daniel Cast MRN: [id number]  SSN: [id number] YOB: 1975  Age: 43 y.o. Sex: female General Instructions 1. No heavy lifting, straining or exercise until notified by the doctor. 2. Do not drive while you are still taking narcotic pain medications. 3. Ok to resume regular diet. 4. Alright to shower and wet wounds after 48 hours, but make sure to pat dry. Do not sit in bath or swim until approved by the doctor. 5. Please call [phone] to schedule follow-up appointment upon discharge. Follow-up should be in one week to ten days. Our office is located at: [address] [place] [address], [facility]. 6. Please fill prescriptions on the way home from the hospital, therefore any unforeseen problems can be dealt with during regular office hours. What to Expect 1. There will be some mild discomfort. Take the pain medication as necessary and don't try to be a hero. 2. There may be some clear to bloody discharge from the wounds. A small amount is normal.  There may be bruising around the incisions. 3. You might have a very small amount of dark stool or blood in the stool. 4. You might experience constipation due to the pain meds. If this occurs, please obtain a laxative from the pharmacy and take as instructed. A good choice is Milk of Magnesia. Also take a daily stool softener to prevent problems. When to call the office (What not to expect) 1. A fever of 101.5 or higher 2. Significant abdominal pain or vomiting 3. Inability to eat or drink 4. Pus from the wounds 5. Rash around the wounds 6. Pain or swelling of the legs When to seek emergency care 1. Significant chest pain or trouble breathing 2. Lightheadedness, dizziness or passing out 3. Significant blood in vomit or stool 4. Severe abdominal pain that will not go away 5. Very low or very high blood pressure. DISCHARGE SUMMARY from Nurse PATIENT INSTRUCTIONS: 
 
 After general anesthesia or intravenous sedation, for 24 hours or while taking prescription Narcotics: · Limit your activities · Do not drive and operate hazardous machinery · Do not make important personal or business decisions · Do  not drink alcoholic beverages · If you have not urinated within 8 hours after discharge, please contact your surgeon on call. Report the following to your surgeon: 
· Excessive pain, swelling, redness or odor of or around the surgical area · Temperature over 100.5 · Nausea and vomiting lasting longer than 4 hours or if unable to take medications · Any signs of decreased circulation or nerve impairment to extremity: change in color, persistent  numbness, tingling, coldness or increase pain · Any questions What to do at Home: These are general instructions for a healthy lifestyle: No smoking/ No tobacco products/ Avoid exposure to second hand smoke Surgeon General's Warning:  Quitting smoking now greatly reduces serious risk to your health. Obesity, smoking, and sedentary lifestyle greatly increases your risk for illness A healthy diet, regular physical exercise & weight monitoring are important for maintaining a healthy lifestyle You may be retaining fluid if you have a history of heart failure or if you experience any of the following symptoms:  Weight gain of 3 pounds or more overnight or 5 pounds in a week, increased swelling in our hands or feet or shortness of breath while lying flat in bed. Please call your doctor as soon as you notice any of these symptoms; do not wait until your next office visit. Recognize signs and symptoms of STROKE: 
 
F-face looks uneven A-arms unable to move or move unevenly S-speech slurred or non-existent T-time-call 911 as soon as signs and symptoms begin-DO NOT go Back to bed or wait to see if you get better-TIME IS BRAIN. Warning Signs of HEART ATTACK Call 911 if you have these symptoms: ? Chest discomfort. Most heart attacks involve discomfort in the center of the chest that lasts more than a few minutes, or that goes away and comes back. It can feel like uncomfortable pressure, squeezing, fullness, or pain. ? Discomfort in other areas of the upper body. Symptoms can include pain or discomfort in one or both arms, the back, neck, jaw, or stomach. ? Shortness of breath with or without chest discomfort. ? Other signs may include breaking out in a cold sweat, nausea, or lightheadedness. Don't wait more than five minutes to call [address]! Fast action can save your life. Calling 911 is almost always the fastest way to get lifesaving treatment. Emergency Medical Services staff can begin treatment when they arrive  up to an hour sooner than if someone gets to the hospital by car. The discharge information has been reviewed with the patient. The patient verbalized understanding. Discharge medications reviewed with the patient and appropriate educational materials and side effects teaching were provided. ___________________________________________________________________________________________________________________________________ Patient armband removed and given to patient to take home. Patient was informed of the privacy risks if armband lost or stolen Introducing [facility] & HEALTH SERVICES! Dear Sebas Human: Thank you for requesting a [employer] account. Our records indicate that you already have an active [employer] account. You can access your account anytime at [email or web address] "[employer]ker"/[employer] Did you know that you can access your hospital and ER discharge instructions at any time in [employer]? You can also review all of your test results from your hospital stay or ER visit. Additional Information If you have questions, please visit the Frequently Asked Questions section of the [employer] website at [email or web address] "[employer]ahab [employer], Broker"/"[employer]t/. Remember, MyChart is NOT to be used for urgent needs. For medical emergencies, dial 911. Now available from your iPhone and Android! Providers Seen During Your Hospitalization Provider Specialty Primary office phone Eulogio Walters, [address] [facility] Surgery [phone] Your Primary Care Physician (PCP) Primary Care Physician Office Phone Office Fax Cali Oneal [phone] You are allergic to the following Allergen Reactions Bactrim (Sulfamethoprim Ds) Nausea and Vomiting Recent Documentation Height Weight BMI OB Status Smoking Status 1.651 m 97.1 kg 35.61 kg/m2 Hysterectomy Never Smoker Emergency Contacts Name Discharge Info Relation Home Work Mobile [address] CAREGIVER [3] Parent [1] [phone] [address] [facility] CAREGIVER [3] Father [15] [phone] Patient Belongings The following personal items are in your possession at time of discharge: 
  Dental Appliances: None  Visual Aid: Glasses, At home      Home Medications: None   Jewelry: Ring  Clothing: Shirt, Socks, Footwear, Undergarments, Pants (given to mom)    Other Valuables: Wallet, Cell Phone, Shipping Company Please provide this summary of care documentation to your next provider. Signatures-by signing, you are acknowledging that this After Visit Summary has been reviewed with you and you have received a copy. Patient Signature:  ____________________________________________________________ Date:  ____________________________________________________________  
  
Oneyda Medico Provider Signature:  ____________________________________________________________ Date:  ____________________________________________________________

## 2018-01-23 NOTE — DISCHARGE INSTRUCTIONS
[facility]  [address] [place] [address]  [phone]    Dr. Perlita Rodriguez' Discharge Instructions    Patient: Avis Flores MRN: [id number]  SSN: [id number]    YOB: 1975  Age: 43 y.o. Sex: female       General Instructions    1. No heavy lifting, straining or exercise until notified by the doctor. 2. Do not drive while you are still taking narcotic pain medications. 3. Ok to resume regular diet. 4. Alright to shower and wet wounds after 48 hours, but make sure to pat dry. Do not sit in bath or swim until approved by the doctor. 5. Please call [phone] to schedule follow-up appointment upon discharge. Follow-up should be in one week to ten days. Our office is located at: [address] [place] [address], [address]. 6. Please fill prescriptions on the way home from the hospital, therefore any unforeseen problems can be dealt with during regular office hours. What to Expect    1. There will be some mild discomfort. Take the pain medication as necessary and don't try to be a hero. 2. There may be some clear to bloody discharge from the wounds. A small amount is normal.  There may be bruising around the incisions. 3. You might have a very small amount of dark stool or blood in the stool. 4. You might experience constipation due to the pain meds. If this occurs, please obtain a laxative from the pharmacy and take as instructed. A good choice is Milk of Magnesia. Also take a daily stool softener to prevent problems. When to call the office (What not to expect)    1. A fever of 101.5 or higher  2. Significant abdominal pain or vomiting  3. Inability to eat or drink  4. Pus from the wounds  5. Rash around the wounds  6. Pain or swelling of the legs     When to seek emergency care    1. Significant chest pain or trouble breathing  2. Lightheadedness, dizziness or passing out  3. Significant blood in vomit or stool  4.  Severe abdominal pain that will not go away  5. Very low or very high blood pressure. DISCHARGE SUMMARY from Nurse    PATIENT INSTRUCTIONS:    After general anesthesia or intravenous sedation, for 24 hours or while taking prescription Narcotics:  · Limit your activities  · Do not drive and operate hazardous machinery  · Do not make important personal or business decisions  · Do  not drink alcoholic beverages  · If you have not urinated within 8 hours after discharge, please contact your surgeon on call. Report the following to your surgeon:  · Excessive pain, swelling, redness or odor of or around the surgical area  · Temperature over 100.5  · Nausea and vomiting lasting longer than 4 hours or if unable to take medications  · Any signs of decreased circulation or nerve impairment to extremity: change in color, persistent  numbness, tingling, coldness or increase pain  · Any questions    What to do at Home:  These are general instructions for a healthy lifestyle:    No smoking/ No tobacco products/ Avoid exposure to second hand smoke  Surgeon General's Warning:  Quitting smoking now greatly reduces serious risk to your health. Obesity, smoking, and sedentary lifestyle greatly increases your risk for illness    A healthy diet, regular physical exercise & weight monitoring are important for maintaining a healthy lifestyle    You may be retaining fluid if you have a history of heart failure or if you experience any of the following symptoms:  Weight gain of 3 pounds or more overnight or 5 pounds in a week, increased swelling in our hands or feet or shortness of breath while lying flat in bed. Please call your doctor as soon as you notice any of these symptoms; do not wait until your next office visit.     Recognize signs and symptoms of STROKE:    F-face looks uneven    A-arms unable to move or move unevenly    S-speech slurred or non-existent    T-time-call 911 as soon as signs and symptoms begin-DO NOT go       Back to bed or wait to see if you get better-TIME IS BRAIN. Warning Signs of HEART ATTACK     Call 911 if you have these symptoms:   Chest discomfort. Most heart attacks involve discomfort in the center of the chest that lasts more than a few minutes, or that goes away and comes back. It can feel like uncomfortable pressure, squeezing, fullness, or pain.  Discomfort in other areas of the upper body. Symptoms can include pain or discomfort in one or both arms, the back, neck, jaw, or stomach.  Shortness of breath with or without chest discomfort.  Other signs may include breaking out in a cold sweat, nausea, or lightheadedness. Don't wait more than five minutes to call 911 - MINUTES MATTER! Fast action can save your life. Calling 911 is almost always the fastest way to get lifesaving treatment. Emergency Medical Services staff can begin treatment when they arrive -- up to an hour sooner than if someone gets to the hospital by car. The discharge information has been reviewed with the patient. The patient verbalized understanding. Discharge medications reviewed with the patient and appropriate educational materials and side effects teaching were provided. ___________________________________________________________________________________________________________________________________  Patient armband removed and given to patient to take home.   Patient was informed of the privacy risks if armband lost or stolen

## 2018-01-24 ENCOUNTER — TELEPHONE (OUTPATIENT)
Dept: SURGERY | Age: 43
End: 2018-01-24

## 2018-01-24 NOTE — TELEPHONE ENCOUNTER
Patient called with the complaint of pain in her right side. States that she informed nurse of her pain prior to her surgery yesterday & that she thought her pain was related to the heavy lifting she does at her job, however, she's still experiencing this pain & relays that she's unable to climb steps because of it & that she becomes SOB upon exertion. Informed patient that Dr. Deanne Boyd is not in the clinic today & would be informed of her complaint once he returns tomorrow morning. Instructed patient that should her symptoms worsen or become unbearable during the meantime that she should present to the nearest ED for an evaluation. Patient expressed understanding & was agreeable.

## 2018-01-25 ENCOUNTER — TELEPHONE (OUTPATIENT)
Dept: SURGERY | Age: 43
End: 2018-01-25

## 2018-01-25 NOTE — TELEPHONE ENCOUNTER
Returned patient's phone call from yesterday to inform her that, per Dr. Austin Mike, she should contact her PCP for an evaluation of her c/o pain of her right side & dyspnea upon exertion. Patient stated that her pain had decreased since her original phone call yesterday & that she's no longer experiencing any dyspnea upon exertion. Explained to patient that should her symptoms return, she should contact her PCP for an evaluation. Patient verbalized understanding & was agreeable by stating, \"Okay, thank you. \"

## 2018-01-30 ENCOUNTER — OFFICE VISIT (OUTPATIENT)
Dept: SURGERY | Age: 43
End: 2018-01-30

## 2018-01-30 VITALS
HEIGHT: 65 IN | SYSTOLIC BLOOD PRESSURE: 129 MMHG | TEMPERATURE: 96.5 F | DIASTOLIC BLOOD PRESSURE: 82 MMHG | BODY MASS INDEX: 37.15 KG/M2 | HEART RATE: 81 BPM | WEIGHT: 223 LBS

## 2018-01-30 DIAGNOSIS — D24.2 FIBROADENOMA OF BREAST, LEFT: Primary | ICD-10-CM

## 2018-01-30 RX ORDER — HYDROXYZINE 25 MG/1
TABLET, FILM COATED ORAL
COMMUNITY
Start: 2018-01-26

## 2018-01-30 RX ORDER — TRIAMCINOLONE ACETONIDE 1 MG/G
CREAM TOPICAL
COMMUNITY
Start: 2018-01-26

## 2018-01-30 NOTE — MR AVS SNAPSHOT
[address] 
 
 
 [address] [facility] [address] [address] 
[phone] Patient: Daniel Cast MRN: [id number] :1975 Visit Information Date & Time Provider Department Dept. Phone Encounter #  
 2018  2:15 PM Paul Carvalho MD [place] [id number] Follow-up Instructions Return in about 6 weeks (around 3/13/2018). Your Appointments 3/13/2018  1:15 PM  
Follow Up with Paul Carvalho MD  
[address]) Appt Note: 6 week follow up  
 [address] [facility] [address]  
  
   
 [address] [facility] [address] Upcoming Health Maintenance Date Due Pneumococcal 19-64 Medium Risk (1 of 1 - PPSV23) 1994 DTaP/Tdap/Td series (1 - Tdap) 1996 Influenza Age 5 to Adult 2017 PAP AKA CERVICAL CYTOLOGY 2020 Allergies as of 2018  Review Complete On: 2018 By: Paul Carvalho MD  
  
 Severity Noted Reaction Type Reactions Bactrim [Sulfamethoprim Ds]  12/10/2013    Nausea and Vomiting Current Immunizations  Never Reviewed No immunizations on file. Not reviewed this visit You Were Diagnosed With   
  
 Codes Comments Fibroadenoma of breast, left    -  Primary ICD-10-CM: D24.2 ICD-9-CM: 782 Vitals BP Pulse Temp Height(growth percentile) Weight(growth percentile) LMP  
 129/82 (BP 1 Location: Right arm, BP Patient Position: Sitting) 81 96.5 °F (35.8 °C) (Oral) 5' 5\" (1.651 m) 223 lb (101.2 kg) 2013 BMI OB Status Smoking Status 37.11 kg/m2 Hysterectomy Never Smoker BMI and BSA Data Body Mass Index Body Surface Area  
 37.11 kg/m 2 2.15 m 2 Preferred Pharmacy Pharmacy Name Phone [address], [address] [place] [address] Ave [phone] Your Updated Medication List  
  
   
This list is accurate as of: 1/30/18  2:20 PM.  Always use your most recent med list.  
  
  
  
  
 ALBUTEROL IN Take  by inhalation as needed. hydrOXYzine HCl 25 mg tablet Commonly known as:  ATARAX  
  
 levothyroxine 100 mcg tablet Commonly known as:  SYNTHROID Take  by mouth daily (before breakfast). NORVASC 5 mg tablet Generic drug:  amLODIPine Take 5 mg by mouth daily. Indications: HYPERTENSION  
  
 PROBIOTIC 4X 10-15 mg Tbec Generic drug:  B.infantis-B.ani-B.long-B.bifi Take  by mouth two (2) times a day. psyllium Powd Commonly known as:  METAMUCIL Take  by mouth two (2) times a day. triamcinolone acetonide 0.1 % topical cream  
Commonly known as:  KENALOG Follow-up Instructions Return in about 6 weeks (around 3/13/2018). Patient Instructions If you have any questions or concerns about today's appointment, the verbal and/or written instructions you were given for follow up care, please call our office at [phone]. [facility] - [address] 
 
[phone] office [phone] Introducing [facility] & HEALTH SERVICES! Dear [facility]: Thank you for requesting a Tekmi account. Our records indicate that you already have an active Tekmi account. You can access your account anytime at [email or web address] Did you know that you can access your hospital and ER discharge instructions at any time in Tekmi? You can also review all of your test results from your hospital stay or ER visit. Additional Information If you have questions, please visit the Frequently Asked Questions section of the Tekmi website at [email or web address] Remember, Tekmi is NOT to be used for urgent needs. For medical emergencies, dial 911. Now available from your iPhone and Android! Please provide this summary of care documentation to your next provider. Your primary care clinician is listed as Riky Grigsby. If you have any questions after today's visit, please call [phone].

## 2018-01-30 NOTE — PROGRESS NOTES
Dawood Rios is a 43 y.o. female who presents today for a post-op exam for post op excisional of left breast biopsy performed on 1/23/18. Patient scores their post-op pain level on a pain scale from 1-10 as a 0 today. 1. Have you been to the ER, urgent care clinic since your last visit? Hospitalized since your last visit? No    2. Have you seen or consulted any other health care providers outside of the [address] [place] [address] since your last visit? Include any pap smears or colon screening. Yes When: Urgent care 1/26 for left chest and side.

## 2018-01-30 NOTE — PROGRESS NOTES
SUBJECTIVE: Jose Alfredo Dominguez is a 43 y.o.  female is seen for a routine postop check. Reports no problems with the wound or other issues. Activity, diet and bowels are normal. Minimal pain. Patient developed a rash widely around the area of the breast in the axilla that was severe enough with burning and itching that she went to the urgent care. They gave her prednisone cream and an oral agent to help the itching. She states that is starting get a little bit better. OBJECTIVE: Appears well. Wound is healing well without complications or infection. There is a moderate healing ridge under the incision but no tenderness or fluctuance. The patient does have a macular rash which is confluent in the axilla and upper chest a little bit on the neck and little bit on the skin below the inframammary crease. There is not a lot of this on the breast tissue per se. I suspect this was related to her ChloraPrep and that she had less reaction around the area of the breast because some of that was wiped off with the surgery. Patient was told what I thought the reaction was from in the future to warn anyone doing a prep on her for surgery    ASSESSMENT: Normal postoperative course, doing well. Pathology revealed a fibroadenoma and a small adjacent papilloma. Skin reaction probably to ChloraPrep    PLAN: Sutures removed. Patient is to call if her rash does not continue to improve. I will plan to see the patient back in 6 weeks. Follow-up Disposition: Not on File    Bridget Cruz MD    Please note: This document has been produced using voice recognition software. Unrecognizable air is in transcription may be present.

## 2018-01-30 NOTE — PATIENT INSTRUCTIONS
If you have any questions or concerns about today's appointment, the verbal and/or written instructions you were given for follow up care, please call our office at [phone].     [facility] - [address]    [phone] office  [phone]

## 2018-01-30 NOTE — COMMUNICATION BODY
Dear Kiera Ross came to the office today in follow-up since the recent excisional biopsy of the small mass in her lateral left breast.  I am pleased to report that this came back a benign fibroadenoma. She also developed a rash a few days after her surgery which appears to be in the distribution of the skin prep that was used for surgery. This was ChloraPrep. I told her to avoid that in the future. She is presently using some steroid cream and oral pill to help with her itching. She states the rash she is getting a bit better. Thank you again for allowing me to be involved with you in her care.      With kindest regards,    Lizzeth Arita MD

## 2018-03-13 ENCOUNTER — OFFICE VISIT (OUTPATIENT)
Dept: SURGERY | Age: 43
End: 2018-03-13

## 2018-03-13 VITALS
TEMPERATURE: 96.8 F | HEIGHT: 65 IN | DIASTOLIC BLOOD PRESSURE: 85 MMHG | OXYGEN SATURATION: 98 % | BODY MASS INDEX: 37.12 KG/M2 | HEART RATE: 73 BPM | WEIGHT: 222.8 LBS | RESPIRATION RATE: 16 BRPM | SYSTOLIC BLOOD PRESSURE: 132 MMHG

## 2018-03-13 DIAGNOSIS — D24.2 FIBROADENOMA OF BREAST, LEFT: Primary | ICD-10-CM

## 2018-03-13 NOTE — PROGRESS NOTES
Chief Complaint   Patient presents with    Follow-up     excision of left breast biopsy performed on 1/23/2018. No pain noted today. 1. Have you been to the ER, urgent care clinic since your last visit? Hospitalized since your last visit? No    2. Have you seen or consulted any other health care providers outside of the [address] [place] [address] since your last visit? Include any pap smears or colon screening.  No

## 2018-03-13 NOTE — MR AVS SNAPSHOT
[address] [address] 
[phone] Patient: Sal Veras MRN: [id number] :1975 Visit Information Date & Time Provider Department Dept. Phone Encounter #  
 3/13/2018  1:15 PM Kala Chaves MD [facility] [phone] [id number] Follow-up Instructions Return if symptoms worsen or fail to improve. Upcoming Health Maintenance Date Due Pneumococcal 19-64 Medium Risk (1 of 1 - PPSV23) 1994 DTaP/Tdap/Td series (1 - Tdap) 1996 Influenza Age 5 to Adult 2017 PAP AKA CERVICAL CYTOLOGY 2020 Allergies as of 3/13/2018  Review Complete On: 3/13/2018 By: Kala Chaves MD  
  
 Severity Noted Reaction Type Reactions Bactrim [Sulfamethoprim Ds]  12/10/2013    Nausea and Vomiting Current Immunizations  Never Reviewed No immunizations on file. Not reviewed this visit You Were Diagnosed With   
  
 Codes Comments Fibroadenoma of breast, left    -  Primary ICD-10-CM: D24.2 ICD-9-CM: 380 Vitals BP Pulse Temp Resp Height(growth percentile) Weight(growth percentile) 132/85 (BP 1 Location: Right arm, BP Patient Position: Sitting) 73 96.8 °F (36 °C) (Oral) 16 5' 5\" (1.651 m) 222 lb 12.8 oz (101.1 kg) LMP SpO2 BMI OB Status Smoking Status 2013 98% 37.08 kg/m2 Hysterectomy Never Smoker BMI and BSA Data Body Mass Index Body Surface Area 37.08 kg/m 2 2.15 m 2 Preferred Pharmacy Pharmacy Name Phone [facility] [place] [address], [address] [place] [address] [phone] Your Updated Medication List  
  
   
This list is accurate as of 3/13/18  1:33 PM.  Always use your most recent med list.  
  
  
  
  
 ALBUTEROL IN Take  by inhalation as needed. hydrOXYzine HCl 25 mg tablet Commonly known as:  ATARAX  
  
 levothyroxine 100 mcg tablet Commonly known as:  SYNTHROID Take  by mouth daily (before breakfast). NORVASC 5 mg tablet Generic drug:  amLODIPine Take 5 mg by mouth daily. Indications: HYPERTENSION  
  
 PROBIOTIC 4X 10-15 mg Tbec Generic drug:  B.infantis-B.ani-B.long-B.bifi Take  by mouth two (2) times a day. psyllium Powd Commonly known as:  METAMUCIL Take  by mouth two (2) times a day. triamcinolone acetonide 0.1 % topical cream  
Commonly known as:  KENALOG Follow-up Instructions Return if symptoms worsen or fail to improve. Patient Instructions If you have any questions or concerns about today's appointment, the verbal and/or written instructions you were given for follow up care, please call our office at [phone]. [facility] - [place] [phone] office [phone] Introducing [facility] [place] [facility] SERVICES! Dear Pankaj Hancock: Thank you for requesting a [employer] account. Our records indicate that you already have an active [employer] account. You can access your account anytime at [email or web address] Did you know that you can access your hospital and ER discharge instructions at any time in [employer]? You can also review all of your test results from your hospital stay or ER visit. Additional Information If you have questions, please visit the Frequently Asked Questions section of the Simple [employer] website at [email or web address] Remember, Simple IT is NOT to be used for urgent needs. For medical emergencies, dial 911. Now available from your iPhone and Android! Please provide this summary of care documentation to your next provider. Your primary care clinician is listed as Helen Frankel. If you have any questions after today's visit, please call [phone].

## 2018-03-13 NOTE — PROGRESS NOTES
SUBJECTIVE: Becky Hassan is a 43 y.o.  female is seen for a routine postop check. Reports no problems with the wound or other issues. Activity, diet and bowels are normal. Minimal pain. Patient has still been having some rash and itching in the left axilla and triceps biceps area. She got a steroid shot this morning from her primary care physician. OBJECTIVE: Appears well. Wound is healing well without complications or infection. ASSESSMENT: Normal postoperative course, doing well. Previously excised fibroadenoma left breast.  Rash developed postoperatively possibly related to medications and/or skin prep. PLAN: Patient is to return as needed. Follow-up Disposition:  Return if symptoms worsen or fail to improve. Stoney Cheatham MD    Please note: This document has been produced using voice recognition software. Unrecognizable air is in transcription may be present.

## 2018-03-13 NOTE — PATIENT INSTRUCTIONS
If you have any questions or concerns about today's appointment, the verbal and/or written instructions you were given for follow up care, please call our office at [phone].     [facility] - [address]    [phone] office  [phone]

## 2018-07-18 ENCOUNTER — APPOINTMENT (OUTPATIENT)
Dept: GENERAL RADIOLOGY | Age: 43
End: 2018-07-18
Attending: PHYSICIAN ASSISTANT
Payer: COMMERCIAL

## 2018-07-18 ENCOUNTER — HOSPITAL ENCOUNTER (EMERGENCY)
Age: 43
Discharge: HOME | End: 2018-07-18
Attending: EMERGENCY MEDICINE
Payer: COMMERCIAL

## 2018-07-18 VITALS
BODY MASS INDEX: 36.7 KG/M2 | DIASTOLIC BLOOD PRESSURE: 89 MMHG | OXYGEN SATURATION: 98 % | HEIGHT: 64 IN | WEIGHT: 215 LBS | RESPIRATION RATE: 14 BRPM | HEART RATE: 75 BPM | SYSTOLIC BLOOD PRESSURE: 134 MMHG | TEMPERATURE: 97.7 F

## 2018-07-18 DIAGNOSIS — R05.9 COUGH: Primary | ICD-10-CM

## 2018-07-18 DIAGNOSIS — R07.9 CHEST PAIN, UNSPECIFIED TYPE: ICD-10-CM

## 2018-07-18 LAB
ANION GAP SERPL CALC-SCNC: 7 MMOL/L (ref 3–18)
BASOPHILS # BLD: 0 K/UL (ref 0–0.1)
BASOPHILS NFR BLD: 0 % (ref 0–2)
BUN SERPL-MCNC: 13 MG/DL (ref 7–18)
BUN/CREAT SERPL: 18 (ref 12–20)
CALCIUM SERPL-MCNC: 8.9 MG/DL (ref 8.5–10.1)
CHLORIDE SERPL-SCNC: 106 MMOL/L (ref 100–108)
CK MB CFR SERPL CALC: NORMAL % (ref 0–4)
CK MB SERPL-MCNC: <1 NG/ML (ref 5–25)
CK SERPL-CCNC: 101 U/L (ref 26–192)
CO2 SERPL-SCNC: 28 MMOL/L (ref 21–32)
CREAT SERPL-MCNC: 0.74 MG/DL (ref 0.6–1.3)
DIFFERENTIAL METHOD BLD: ABNORMAL
EOSINOPHIL # BLD: 0.4 K/UL (ref 0–0.4)
EOSINOPHIL NFR BLD: 7 % (ref 0–5)
ERYTHROCYTE [DISTWIDTH] IN BLOOD BY AUTOMATED COUNT: 13.2 % (ref 11.6–14.5)
GLUCOSE SERPL-MCNC: 86 MG/DL (ref 74–99)
HCT VFR BLD AUTO: 42.7 % (ref 35–45)
HGB BLD-MCNC: 14.5 G/DL (ref 12–16)
LYMPHOCYTES # BLD: 1.7 K/UL (ref 0.9–3.6)
LYMPHOCYTES NFR BLD: 27 % (ref 21–52)
MCH RBC QN AUTO: 29.2 PG (ref 24–34)
MCHC RBC AUTO-ENTMCNC: 34 G/DL (ref 31–37)
MCV RBC AUTO: 85.9 FL (ref 74–97)
MONOCYTES # BLD: 0.6 K/UL (ref 0.05–1.2)
MONOCYTES NFR BLD: 9 % (ref 3–10)
NEUTS SEG # BLD: 3.6 K/UL (ref 1.8–8)
NEUTS SEG NFR BLD: 57 % (ref 40–73)
PLATELET # BLD AUTO: 261 K/UL (ref 135–420)
PMV BLD AUTO: 10.6 FL (ref 9.2–11.8)
POTASSIUM SERPL-SCNC: 4.1 MMOL/L (ref 3.5–5.5)
RBC # BLD AUTO: 4.97 M/UL (ref 4.2–5.3)
SODIUM SERPL-SCNC: 141 MMOL/L (ref 136–145)
TROPONIN I SERPL-MCNC: <0.02 NG/ML (ref 0–0.04)
WBC # BLD AUTO: 6.3 K/UL (ref 4.6–13.2)

## 2018-07-18 PROCEDURE — 74011250636 HC RX REV CODE- 250/636: Performed by: EMERGENCY MEDICINE

## 2018-07-18 PROCEDURE — 80048 BASIC METABOLIC PNL TOTAL CA: CPT

## 2018-07-18 PROCEDURE — 85025 COMPLETE CBC W/AUTO DIFF WBC: CPT

## 2018-07-18 PROCEDURE — 74011250637 HC RX REV CODE- 250/637: Performed by: EMERGENCY MEDICINE

## 2018-07-18 PROCEDURE — 74011000250 HC RX REV CODE- 250: Performed by: EMERGENCY MEDICINE

## 2018-07-18 PROCEDURE — 94640 AIRWAY INHALATION TREATMENT: CPT

## 2018-07-18 PROCEDURE — 71046 X-RAY EXAM CHEST 2 VIEWS: CPT

## 2018-07-18 PROCEDURE — 96374 THER/PROPH/DIAG INJ IV PUSH: CPT

## 2018-07-18 PROCEDURE — 93005 ELECTROCARDIOGRAM TRACING: CPT

## 2018-07-18 PROCEDURE — 77030029684 HC NEB SM VOL KT MONA -A

## 2018-07-18 PROCEDURE — 82553 CREATINE MB FRACTION: CPT

## 2018-07-18 PROCEDURE — 99284 EMERGENCY DEPT VISIT MOD MDM: CPT

## 2018-07-18 RX ORDER — GUAIFENESIN 600 MG/1
600 TABLET, EXTENDED RELEASE ORAL
Status: COMPLETED | OUTPATIENT
Start: 2018-07-18 | End: 2018-07-18

## 2018-07-18 RX ORDER — ACETAMINOPHEN 325 MG/1
650 TABLET ORAL
Qty: 20 TAB | Refills: 0 | Status: SHIPPED | OUTPATIENT
Start: 2018-07-18 | End: 2018-10-08

## 2018-07-18 RX ORDER — KETOROLAC TROMETHAMINE 30 MG/ML
15 INJECTION, SOLUTION INTRAMUSCULAR; INTRAVENOUS
Status: COMPLETED | OUTPATIENT
Start: 2018-07-18 | End: 2018-07-18

## 2018-07-18 RX ORDER — IPRATROPIUM BROMIDE AND ALBUTEROL SULFATE 2.5; .5 MG/3ML; MG/3ML
3 SOLUTION RESPIRATORY (INHALATION)
Status: COMPLETED | OUTPATIENT
Start: 2018-07-18 | End: 2018-07-18

## 2018-07-18 RX ORDER — GUAIFENESIN 600 MG/1
600 TABLET, EXTENDED RELEASE ORAL 2 TIMES DAILY
Qty: 10 TAB | Refills: 0 | Status: SHIPPED | OUTPATIENT
Start: 2018-07-18 | End: 2018-07-23

## 2018-07-18 RX ORDER — IBUPROFEN 600 MG/1
600 TABLET ORAL
Qty: 20 TAB | Refills: 0 | Status: SHIPPED | OUTPATIENT
Start: 2018-07-18 | End: 2018-10-08

## 2018-07-18 RX ADMIN — KETOROLAC TROMETHAMINE 15 MG: 30 INJECTION, SOLUTION INTRAMUSCULAR at 14:03

## 2018-07-18 RX ADMIN — IPRATROPIUM BROMIDE AND ALBUTEROL SULFATE 3 ML: .5; 3 SOLUTION RESPIRATORY (INHALATION) at 14:03

## 2018-07-18 RX ADMIN — GUAIFENESIN 600 MG: 600 TABLET, EXTENDED RELEASE ORAL at 14:03

## 2018-07-18 NOTE — ED PROVIDER NOTES
EMERGENCY DEPARTMENT HISTORY AND PHYSICAL EXAM    1:04 PM      Date: 7/18/2018  Patient Name: Danielle Dewey    History of Presenting Illness     Chief Complaint   Patient presents with    Cough    Chest Pain         History Provided By: Patient    Chief Complaint: CP  Duration: 1 Weeks  Timing:  Intermittent at different durations  Location: L sided  Quality: Sharp, Pressure, Tightness  Severity: 10 out of 10  Modifying Factors: worse when lying flat and palpating  Associated Symptoms: cough      Additional History (Context): Danielle Dewey is a 43 y.o. female with hypertension, asthma and reflux who presents with intermittent L sided sharp CP for 1 wk. Pain is worsened by lying flat, cough, and palpation. Pain is not exertional. Associated sx include cough, congestion. Denies rhinorrhea, sore throat, sinus pain, abd pain, blood in stool, diaphoresis. No relief with albuterol inhaler and no recent steroids; no hx intubation. No etOH, no recent long travel. PCP: WOJCIECH Odonnell    Current Outpatient Prescriptions   Medication Sig Dispense Refill    guaiFENesin ER (MUCINEX) 600 mg ER tablet Take 1 Tab by mouth two (2) times a day for 10 doses. 10 Tab 0    ibuprofen (MOTRIN) 600 mg tablet Take 1 Tab by mouth every six (6) hours as needed for Pain. 20 Tab 0    acetaminophen (TYLENOL) 325 mg tablet Take 2 Tabs by mouth every four (4) hours as needed for Pain. 20 Tab 0    hydrOXYzine HCl (ATARAX) 25 mg tablet       triamcinolone acetonide (KENALOG) 0.1 % topical cream       B.infantis-B.ani-B.long-B.bifi (PROBIOTIC 4X) 10-15 mg TbEC Take  by mouth two (2) times a day.  psyllium (METAMUCIL) powd Take  by mouth two (2) times a day.  amLODIPine (NORVASC) 5 mg tablet Take 5 mg by mouth daily. Indications: HYPERTENSION      ALBUTEROL IN Take  by inhalation as needed.  levothyroxine (SYNTHROID) 100 mcg tablet Take  by mouth daily (before breakfast).          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  [facility]  02/23/2010    with intraoperative cholangiogram    HX HYSTERECTOMY         Family History:  Family History   Problem Relation Age of Onset    Hypertension Father     Diabetes Father     Diabetes Paternal Uncle        Social History:  Social History   Substance Use Topics    Smoking status: Never Smoker    Smokeless tobacco: Never Used    Alcohol use No       Allergies: Allergies   Allergen Reactions    Bactrim [Sulfamethoprim Ds] Nausea and Vomiting         Review of Systems       Review of Systems   Constitutional: Negative for activity change, fatigue and fever. HENT: Negative for congestion and rhinorrhea. Eyes: Negative for visual disturbance. Respiratory: Positive for cough. Negative for shortness of breath. Cardiovascular: Positive for chest pain. Negative for palpitations. Gastrointestinal: Negative for abdominal pain, blood in stool, diarrhea, nausea and vomiting. Genitourinary: Negative for dysuria and hematuria. Musculoskeletal: Negative for back pain. Skin: Negative for rash. Neurological: Negative for dizziness, weakness and light-headedness. Psychiatric/Behavioral: Negative for agitation. All other systems reviewed and are negative.         Physical Exam     Visit Vitals    /89    Pulse 75    Temp 97.7 °F (36.5 °C)    Resp 14    Ht 5' 4\" (1.626 m)    Wt 97.5 kg (215 lb)    LMP 11/25/2013    SpO2 98%    BMI 36.9 kg/m2         Physical Exam   Constitutional: She appears well-developed and well-nourished. No distress. HENT:   Head: Normocephalic and atraumatic. Right Ear: External ear normal.   Left Ear: External ear normal.   Nose: Nose normal.   Mouth/Throat: Oropharynx is clear and moist.   Eyes: Conjunctivae and EOM are normal. Pupils are equal, round, and reactive to light. No scleral icterus. Neck: Normal range of motion. Neck supple. No JVD present. No tracheal deviation present. No thyromegaly present. Cardiovascular: Normal rate and regular rhythm. Exam reveals no friction rub. No murmur heard. Pulmonary/Chest: Effort normal and breath sounds normal. No stridor. She exhibits no tenderness. Chest wall ttp on L side   Abdominal: Soft. Bowel sounds are normal. She exhibits no distension. There is no tenderness. There is no rebound and no guarding. Musculoskeletal: Normal range of motion. She exhibits no edema or tenderness. Lymphadenopathy:     She has no cervical adenopathy. Neurological: She is alert. No cranial nerve deficit. Coordination normal.   Skin: Skin is warm and dry. Psychiatric: She has a normal mood and affect. Her behavior is normal. Judgment and thought content normal.   Nursing note and vitals reviewed.         Diagnostic Study Results     Labs -  Recent Results (from the past 12 hour(s))   EKG, 12 LEAD, INITIAL    Collection Time: 07/18/18 12:29 PM   Result Value Ref Range    Ventricular Rate 76 BPM    Atrial Rate 76 BPM    P-R Interval 168 ms    QRS Duration 74 ms    Q-T Interval 374 ms    QTC Calculation (Bezet) 420 ms    Calculated P Axis 18 degrees    Calculated R Axis 11 degrees    Calculated T Axis 49 degrees    Diagnosis       Normal sinus rhythm  Septal infarct , age undetermined  Abnormal ECG     CBC WITH AUTOMATED DIFF    Collection Time: 07/18/18 12:56 PM   Result Value Ref Range    WBC 6.3 4.6 - 13.2 K/uL    RBC 4.97 4.20 - 5.30 M/uL    HGB 14.5 12.0 - 16.0 g/dL    HCT 42.7 35.0 - 45.0 %    MCV 85.9 74.0 - 97.0 FL    MCH 29.2 24.0 - 34.0 PG MCHC 34.0 31.0 - 37.0 g/dL    RDW 13.2 11.6 - 14.5 %    PLATELET 204 662 - 557 K/uL    MPV 10.6 9.2 - 11.8 FL    NEUTROPHILS 57 40 - 73 %    LYMPHOCYTES 27 21 - 52 %    MONOCYTES 9 3 - 10 %    EOSINOPHILS 7 (H) 0 - 5 %    BASOPHILS 0 0 - 2 %    ABS. NEUTROPHILS 3.6 1.8 - 8.0 K/UL    ABS. LYMPHOCYTES 1.7 0.9 - 3.6 K/UL    ABS. MONOCYTES 0.6 0.05 - 1.2 K/UL    ABS. EOSINOPHILS 0.4 0.0 - 0.4 K/UL    ABS. BASOPHILS 0.0 0.0 - 0.1 K/UL    DF AUTOMATED     METABOLIC PANEL, BASIC    Collection Time: 07/18/18 12:56 PM   Result Value Ref Range    Sodium 141 136 - 145 mmol/L    Potassium 4.1 3.5 - 5.5 mmol/L    Chloride 106 100 - 108 mmol/L    CO2 28 21 - 32 mmol/L    Anion gap 7 3.0 - 18 mmol/L    Glucose 86 74 - 99 mg/dL    BUN 13 7.0 - 18 MG/DL    Creatinine 0.74 0.6 - 1.3 MG/DL    BUN/Creatinine ratio 18 12 - 20      GFR est AA >60 >60 ml/min/1.73m2    GFR est non-AA >60 >60 ml/min/1.73m2    Calcium 8.9 8.5 - 10.1 MG/DL   CARDIAC PANEL,(CK, CKMB & TROPONIN)    Collection Time: 07/18/18 12:56 PM   Result Value Ref Range     26 - 192 U/L    CK - MB <1.0 <3.6 ng/ml    CK-MB Index  0.0 - 4.0 %     CALCULATION NOT PERFORMED WHEN RESULT IS BELOW LINEAR LIMIT    Troponin-I, Qt. <0.02 0.0 - 0.045 NG/ML       Radiologic Studies -   XR CHEST PA LAT    (Results Pending)     No results found. Medical Decision Making   I am the first provider for this patient. I reviewed the vital signs, available nursing notes, past medical history, past surgical history, family history and social history. Vital Signs-Reviewed the patient's vital signs. Pulse Oximetry Analysis -  97% on room air (Interpretation) wnl    Cardiac Monitor:  Rate: 85        EKG: Interpreted by the EP.    Time Interpreted: 1230   Rate: 76   Rhythm: Normal Sinus Rhythm    Interpretation: nml intervals and axis, no sign of ischemia or infarction    Records Reviewed: Nursing Notes (Time of Review: 1:04 PM)    ED Course: Progress Notes, Reevaluation, and Consults:    Patient is much improved in the emergency department. Comfortable with outpatient follow-up. Labs and x-rays reviewed with the patient. Provider Notes (Medical Decision Making):     Pt with intermittent CP, nonexertional, palpation and coughing causes pain; no cardiac disease. Symptomatic treatment, x-ray with no sign of PNA, low risk for PE. Diagnosis     Clinical Impression:   1. Cough    2. Chest pain, unspecified type        Disposition: Discharge     Follow-up Information     Follow up With Details Comments [address], [place] Call in 1 day Follow Up From Emergency Department Sam Bone [address]             Patient's Medications   Start Taking    ACETAMINOPHEN (TYLENOL) 325 MG TABLET    Take 2 Tabs by mouth every four (4) hours as needed for Pain. GUAIFENESIN ER (MUCINEX) 600 MG ER TABLET    Take 1 Tab by mouth two (2) times a day for 10 doses. IBUPROFEN (MOTRIN) 600 MG TABLET    Take 1 Tab by mouth every six (6) hours as needed for Pain. Continue Taking    ALBUTEROL IN    Take  by inhalation as needed. AMLODIPINE (NORVASC) 5 MG TABLET    Take 5 mg by mouth daily. Indications: HYPERTENSION    B. INFANTIS-B. ANI-B. LONG-B.BIFI (PROBIOTIC 4X) 10-15 MG TBEC    Take  by mouth two (2) times a day. HYDROXYZINE HCL (ATARAX) 25 MG TABLET        LEVOTHYROXINE (SYNTHROID) 100 MCG TABLET    Take  by mouth daily (before breakfast). PSYLLIUM (METAMUCIL) POWD    Take  by mouth two (2) times a day.     TRIAMCINOLONE ACETONIDE (KENALOG) 0.1 % TOPICAL CREAM       These Medications have changed    No medications on file   Stop Taking    No medications on file     _______________________________    Attestations:  Scribe [address] [place] [address] acting as a scribe for and in the presence of Ankit Mendoza MD      July 18, 2018 at 2:36 PM       Provider Attestation:      I personally performed the services described in the documentation, reviewed the documentation, as recorded by the scribe in my presence, and it accurately and completely records my words and actions.  July 18, 2018 at 2:36 PM - Megan Coughlin MD    _______________________________

## 2018-07-18 NOTE — ED TRIAGE NOTES
\"I've been coughing and I have horrible chest pain. It makes me short of breath. My legs almost gave out on me too today at work. \"

## 2018-07-18 NOTE — ED NOTES
I performed a brief evaluation, including history and physical, of the patient here in triage and I have determined that pt will need further treatment and evaluation from the main side ER physician. I have placed initial orders to help in expediting patients care.      July 18, 2018 at 12:26 PM - Yonny Owens PA-C        Visit Vitals    BP (!) 148/97 (BP 1 Location: Right arm, BP Patient Position: Sitting)    Pulse 85    Temp 97.7 °F (36.5 °C)    Resp 18    Ht 5' 4\" (1.626 m)    Wt 97.5 kg (215 lb)    SpO2 97%    BMI 36.9 kg/m2

## 2018-07-18 NOTE — DISCHARGE INSTRUCTIONS
Cough: Care Instructions  Your Care Instructions    A cough is your body's response to something that bothers your throat or airways. Many things can cause a cough. You might cough because of a cold or the flu, bronchitis, or asthma. Smoking, postnasal drip, allergies, and stomach acid that backs up into your throat also can cause coughs. A cough is a symptom, not a disease. Most coughs stop when the cause, such as a cold, goes away. You can take a few steps at home to cough less and feel better. Follow-up care is a key part of your treatment and safety. Be sure to make and go to all appointments, and call your doctor if you are having problems. It's also a good idea to know your test results and keep a list of the medicines you take. How can you care for yourself at home? · Drink lots of water and other fluids. This helps thin the mucus and soothes a dry or sore throat. Honey or lemon juice in hot water or tea may ease a dry cough. · Take cough medicine as directed by your doctor. · Prop up your head on pillows to help you breathe and ease a dry cough. · Try cough drops to soothe a dry or sore throat. Cough drops don't stop a cough. Medicine-flavored cough drops are no better than candy-flavored drops or hard candy. · Do not smoke. Avoid secondhand smoke. If you need help quitting, talk to your doctor about stop-smoking programs and medicines. These can increase your chances of quitting for good. When should you call for help? Call 911 anytime you think you may need emergency care.  For example, call if:    · You have severe trouble breathing.    Call your doctor now or seek immediate medical care if:    · You cough up blood.     · You have new or worse trouble breathing.     · You have a new or higher fever.     · You have a new rash.    Watch closely for changes in your health, and be sure to contact your doctor if:    · You cough more deeply or more often, especially if you notice more mucus or a change in the color of your mucus.     · You have new symptoms, such as a sore throat, an earache, or sinus pain.     · You do not get better as expected. Where can you learn more? Go to http://roz-gonzalo.info/. Enter D279 in the search box to learn more about \"Cough: Care Instructions. \"  Current as of: December 6, 2017  Content Version: 11.7  © 9256-0266 [employer]. Care instructions adapted under license by [employer] (which disclaims liability or warranty for this information). If you have questions about a medical condition or this instruction, always ask your healthcare professional. [address] any warranty or liability for your use of this information.

## 2018-07-19 LAB
ATRIAL RATE: 76 BPM
CALCULATED P AXIS, ECG09: 18 DEGREES
CALCULATED R AXIS, ECG10: 11 DEGREES
CALCULATED T AXIS, ECG11: 49 DEGREES
DIAGNOSIS, 93000: NORMAL
P-R INTERVAL, ECG05: 168 MS
Q-T INTERVAL, ECG07: 374 MS
QRS DURATION, ECG06: 74 MS
QTC CALCULATION (BEZET), ECG08: 420 MS
VENTRICULAR RATE, ECG03: 76 BPM

## 2018-10-08 ENCOUNTER — HOSPITAL ENCOUNTER (EMERGENCY)
Age: 43
Discharge: HOME | End: 2018-10-08
Attending: EMERGENCY MEDICINE
Payer: COMMERCIAL

## 2018-10-08 ENCOUNTER — APPOINTMENT (OUTPATIENT)
Dept: GENERAL RADIOLOGY | Age: 43
End: 2018-10-08
Attending: EMERGENCY MEDICINE
Payer: COMMERCIAL

## 2018-10-08 VITALS
OXYGEN SATURATION: 97 % | DIASTOLIC BLOOD PRESSURE: 84 MMHG | SYSTOLIC BLOOD PRESSURE: 131 MMHG | RESPIRATION RATE: 14 BRPM | BODY MASS INDEX: 36.82 KG/M2 | HEIGHT: 65 IN | TEMPERATURE: 98.3 F | HEART RATE: 85 BPM | WEIGHT: 221 LBS

## 2018-10-08 DIAGNOSIS — M79.672 LEFT FOOT PAIN: Primary | ICD-10-CM

## 2018-10-08 PROCEDURE — 74011250637 HC RX REV CODE- 250/637: Performed by: EMERGENCY MEDICINE

## 2018-10-08 PROCEDURE — 99283 EMERGENCY DEPT VISIT LOW MDM: CPT

## 2018-10-08 PROCEDURE — 73630 X-RAY EXAM OF FOOT: CPT

## 2018-10-08 RX ORDER — IBUPROFEN 600 MG/1
600 TABLET ORAL
Status: COMPLETED | OUTPATIENT
Start: 2018-10-08 | End: 2018-10-08

## 2018-10-08 RX ORDER — IBUPROFEN 600 MG/1
TABLET ORAL
Status: DISCONTINUED
Start: 2018-10-08 | End: 2018-10-08 | Stop reason: HOSPADM

## 2018-10-08 RX ORDER — ACETAMINOPHEN 500 MG
500 TABLET ORAL
Qty: 60 TAB | Refills: 0 | Status: SHIPPED | OUTPATIENT
Start: 2018-10-08 | End: 2019-05-13

## 2018-10-08 RX ORDER — IBUPROFEN 600 MG/1
600 TABLET ORAL
Qty: 30 TAB | Refills: 0 | Status: SHIPPED | OUTPATIENT
Start: 2018-10-08 | End: 2019-05-13

## 2018-10-08 RX ORDER — PAROXETINE HYDROCHLORIDE 20 MG/1
TABLET, FILM COATED ORAL DAILY
COMMUNITY

## 2018-10-08 RX ADMIN — IBUPROFEN 600 MG: 600 TABLET ORAL at 01:18

## 2018-10-08 NOTE — DISCHARGE INSTRUCTIONS

## 2018-10-08 NOTE — ED NOTES
I have reviewed discharge instructions with the patient and spouse. The patient and spouse verbalized understanding. Patient armband removed and given to patient to take home. Patient was informed of the privacy risks if armband lost or stolen

## 2018-10-08 NOTE — ED PROVIDER NOTES
EMERGENCY DEPARTMENT HISTORY AND PHYSICAL EXAM 
 
1:09 AM 
 
 
Date: 10/8/2018 Patient Name: Chantell Patel History of Presenting Illness Chief Complaint Patient presents with  Foot Pain History Provided By: Patient Chief Complaint: Foot Pain Duration: 2-3 Days Timing:  Acute Location: Left Foot Quality: Cramping Severity: Mild Modifying Factors: Weight bearing exacerbates pain Associated Symptoms: No associated Sx Additional History (Context): Chantell Patel is a 43 y.o. female with a PMHx of asthma, hepatitis A, HTN, thyroid disorder, fibroids, and other noted PMHx who presents with acute, mild left foot pain described as cramping on the bottom and lateral side of the foot onset x 2-3 days. Tylenol had no significant relief. Pt reports she consistently works on her feet for work. Denies SOB, ankle pain, and knee pain. No further concerns or complaints at this time. PCP: WOJCIECH Amato Current Outpatient Prescriptions Medication Sig Dispense Refill  PARoxetine (PAXIL) 20 mg tablet Take  by mouth daily.  montelukast sodium (SINGULAIR PO) Take  by mouth.  ibuprofen (MOTRIN) 600 mg tablet Take 1 Tab by mouth every six (6) hours as needed for Pain. 30 Tab 0  
 acetaminophen (TYLENOL) 500 mg tablet Take 1 Tab by mouth every six (6) hours as needed for Pain. 60 Tab 0  
 hydrOXYzine HCl (ATARAX) 25 mg tablet  amLODIPine (NORVASC) 5 mg tablet Take 5 mg by mouth daily. Indications: HYPERTENSION    
 levothyroxine (SYNTHROID) 100 mcg tablet Take  by mouth daily (before breakfast).  triamcinolone acetonide (KENALOG) 0.1 % topical cream     
 B.infantis-B.ani-B.long-B.bifi (PROBIOTIC 4X) 10-15 mg TbEC Take  by mouth two (2) times a day.  psyllium (METAMUCIL) powd Take  by mouth two (2) times a day.  ALBUTEROL IN Take  by inhalation as needed. Past History Past Medical History: 
Past Medical History:  
Diagnosis Date  Abscess   
 skin abcesses  Asthma  Fibroids 8/27/2013  Gastrointestinal disorder   
 acid reflux  Hepatitis Hep A from food  Hypertension 10/2013  Thyroid disorder Past Surgical History: 
Past Surgical History:  
Procedure Laterality Date  HX BREAST LUMPECTOMY Left 1/23/2018 Excisional left breast biopsy performed by Davi Allen MD at [address] [place] [address] HX CHOLECYSTECTOMY  02/23/2010  
 with intraoperative cholangiogram  
 HX HYSTERECTOMY Family History: 
Family History Problem Relation Age of Onset  Hypertension Father  Diabetes Father  Diabetes Paternal Uncle Social History: 
Social History Substance Use Topics  Smoking status: Never Smoker  Smokeless tobacco: Never Used  Alcohol use No  
 
 
Allergies: Allergies Allergen Reactions  Bactrim [Sulfamethoprim Ds] Nausea and Vomiting Review of Systems Review of Systems Respiratory: Negative for shortness of breath. Musculoskeletal: Positive for myalgias (left foot; bottom and lateral side). Negative for ankle and knee pain. All other systems reviewed and are negative. Visit Vitals  /84 (BP 1 Location: Right arm, BP Patient Position: Sitting)  Pulse 85  Temp 98.3 °F (36.8 °C)  Resp 14  
 Ht 5' 5\" (1.651 m)  Wt 100.2 kg (221 lb)  LMP 11/25/2013  SpO2 97%  BMI 36.78 kg/m2 Physical Exam / Medical Decision Making I am the first provider for this patient. I reviewed the vital signs, available nursing notes, past medical history, past surgical history, family history and social history. Vital Signs-Reviewed the patient's vital signs. Pulse Oximetry: 97% on room air, non-hypoxic. Physical exam: 
General:  Well-developed, well-nourished, no apparent distress Head:  Normocephalic atraumatic Eyes:  Pupils midrange extraocular movements grossly intact. Nose:  No rhinorrhea, inspection grossly normal   
Ears:  Grossly normal to inspection Mouth:  Mucous membranes moist   
Neck/Back:  Trachea midline, no asymmetry Chest:  Grossly normal inspection, symmetric chest rise, respirations nonlabored Extremities:  Grossly normal to inspection  LEFT foot easily palpable dorsalis pedis and posterior tibial pulses, pain is only located around the base of the 5th metatarsal, there is no overlying erythema there is no swelling, has tenderness palpation of the plantar fascia. Neurologic:  Alert and oriented no appreciable focal neurologic deficit Psychiatric:  Grossly normal mood and affect Nursing note reviewed, vital signs reviewed. ED course: 
Patient presents with LEFT foot pain atraumatic most likely a plantar fasciitis or ligamentous process, x-ray was unremarkable for any fracture. Tylenol Motrin, plantar fasciitis exercises, crutches for comfort and follow up with PCP for further management. Please change shoes and obtain supportive footwear Patient's presentation, history, physical exam and laboratory evaluations were reviewed. At this time patient was felt to be stable for outpatient management and follow with primary care/specialist.  Patient was instructed to return to the emergency department with any concerns. Disposition: 
 
Discharged home Portions of this chart were created with Dragon medical speech to text program.   Unrecognized errors may be present. Diagnostic Study Results Labs - No results found for this or any previous visit (from the past 12 hour(s)). Radiologic Studies -  
XR FOOT LT MIN 3 V    (Results Pending) Diagnosis Clinical Impression: 1. Left foot pain Follow-up Information Follow up With Details Comments Contact Info WOJCIECH Todd Call in 2 days  [address] [place] [id number] 
[phone] [facility] EMERGENCY DEPT  As needed, If symptoms worsen [address] 
[phone] Discharge Medication List as of 10/8/2018  1:56 AM  
  
CONTINUE these medications which have CHANGED Details  
ibuprofen (MOTRIN) 600 mg tablet Take 1 Tab by mouth every six (6) hours as needed for Pain., Normal, Disp-30 Tab, R-0  
  
acetaminophen (TYLENOL) 500 mg tablet Take 1 Tab by mouth every six (6) hours as needed for Pain., Normal, Disp-60 Tab, R-0  
  
  
CONTINUE these medications which have NOT CHANGED Details PARoxetine (PAXIL) 20 mg tablet Take  by mouth daily. , Historical Med  
  
montelukast sodium (SINGULAIR PO) Take  by mouth., Historical Med  
  
hydrOXYzine HCl (ATARAX) 25 mg tablet Historical Med  
  
amLODIPine (NORVASC) 5 mg tablet Take 5 mg by mouth daily. Indications: HYPERTENSION, Historical Med  
  
levothyroxine (SYNTHROID) 100 mcg tablet Take  by mouth daily (before breakfast). Historical Med  
  
triamcinolone acetonide (KENALOG) 0.1 % topical cream Historical Med  
  
B.infantis-B.ani-B.long-B.bifi (PROBIOTIC 4X) 10-15 mg TbEC Take  by mouth two (2) times a day., Historical Med  
  
psyllium (METAMUCIL) powd Take  by mouth two (2) times a day., Historical Med ALBUTEROL IN Take  by inhalation as needed. Historical Med  
  
  
 
_______________________________ Attestations: 
Scribe Attestation Rashawn Bower acting as a scribe for and in the presence of Jacki Gaines MD     
October 08, 2018 at 1:09 AM 
    
Provider Attestation:     
I personally performed the services described in the documentation, reviewed the documentation, as recorded by the scribe in my presence, and it accurately and completely records my words and actions. October 08, 2018 at 1:09 AM - Jacki Gaines MD   
_______________________________

## 2018-10-08 NOTE — LETTER
NOTIFICATION RETURN TO WORK / SCHOOL 
 
10/8/2018 2:30 AM 
 
Ms. Rosaura Cool [address] [facility] [address] [phone] To Whom It May Concern: 
 
Rosaura Cool is currently under the care of [facility] EMERGENCY DEPT. She will return to work/school on: 10-10-18 If there are questions or concerns please have the patient contact our office. Sincerely, Nelida San RN

## 2019-02-05 ENCOUNTER — HOSPITAL ENCOUNTER (OUTPATIENT)
Dept: PHYSICAL THERAPY | Age: 44
Discharge: HOME | End: 2019-02-05
Payer: COMMERCIAL

## 2019-02-05 PROCEDURE — 97161 PT EVAL LOW COMPLEX 20 MIN: CPT

## 2019-02-05 PROCEDURE — 97530 THERAPEUTIC ACTIVITIES: CPT

## 2019-02-05 PROCEDURE — 97110 THERAPEUTIC EXERCISES: CPT

## 2019-02-05 NOTE — PROGRESS NOTES
[facility] PHYSICAL THERAPY  [address] [place] [address] Shaina Cummins, [address] - Phone: [phone]  Fax: [phone] / [facility]  Patient Name: Theresa Aguilar : 1975   Medical   Diagnosis: Low back pain [M54.5] Treatment Diagnosis: L/S Radiculopathy. Onset Date: . Referral Source: Wu Naranjo,* Start of Care [facility] [place]): 2019   Prior Hospitalization: See medical history Provider #: [id number]   Prior Level of Function: Working independently, recreationally active   Comorbidities: OA, HTN, obese. Medications: Verified on Patient Summary List   The Plan of Care and following information is based on the information from the initial evaluation.   ===========================================================================================  Assessment / key information: Patient is a 37 y.o. female with CC CLBP with recent onset of BLE radiculopathy. Patient reports pain has fluctuated over the past year, now having radiating pain past her knees. She reports sleep disturbance due to pain, as well as flexion deformity upon rise. She has difficulty at times negotiating stairs and transferring to/from floor for work activities. Pain also present with donning/doffing. Patient demonstrates ability to centralize symptoms via MDT. The patient will benefit from physical therapist management to address her impairments (listed below),  educate her, and improve her level of function.  Thanks for your referral.   ===========================================================================================  Eval Complexity: History: HIGH Complexity :3+ comorbidities / personal factors will impact the outcome/ POC Exam:LOW Complexity : 1-2 Standardized tests and measures addressing body structure, function, activity limitation and / or participation in recreation  Presentation: LOW Complexity : Stable, uncomplicated  Clinical Decision Making:MEDIUM Complexity : FOTO score of 26-74Overall Complexity:LOW   Problem List: pain affecting function, decrease ROM, decrease strength, edema affecting function, decrease ADL/ functional abilitiies, decrease activity tolerance, decrease flexibility/ joint mobility and decrease transfer abilities  FOTO score: 30 indicating 70% functional disability   Treatment Plan may include any combination of the following: Therapeutic exercise, Therapeutic activities, Neuromuscular re-education, Physical agent/modality, Manual therapy, Patient education, Self Care training, Functional mobility training and Home safety training  Patient / Family readiness to learn indicated by: asking questions, trying to perform skills and interest  Persons(s) to be included in education: patient (P)  Barriers to Learning/Limitations: None  Measures taken: n/a   Patient Goal (s): \"Be in less pain\"   Patient self reported health status: good  Rehabilitation Potential: excellent   Short Term Goals: To be accomplished in  2-3  weeks:  1. Patient to be adherent to HEP to facilitate pain control with ADL's  2. Patient to centralize BLE sx to level of L/S to demonstrate effectiveness of directional preference exercises and decrease risk of LE dysfunction  3. Patient to report no sleep disturbance secondary to LBP and radiculopathy. 4. Patient to don/doff shoes with minimal difficulty.  Long Term Goals: To be accomplished in  4-6  weeks:  1. Patient to be Safe and Independent with HEP to self-manage/prevent symptoms after DC. 2. Patient to demonstrate full L/S AROM in all planes without peripheralization to indicate return to function. 3. Patient to increase FS FOTO score to > 51 to indicate increased functional independence. 4. Patient to demonstrate safe floor <-->stand transfers without exacerbation of LBP to facilitate work activity tolerance.    Frequency / Duration:   Patient to be seen  2-3  times per week for 6  weeks:  Patient / Caregiver education and instruction: activity modification and exercises. We reviewed our facility's Patient Personal Responsibilities (PPR) form, particularly in regards to compliance towards her appointment time, our attendance policy, and her home exercise program. Patient was informed of possible discharge for non-compliance to our attendance policy per PPR form. We also discussed her POC as deemed appropriate by the treating therapist and physician. Patient verbalized understanding that she must show objective and functional improvement in an appropriate time frame. Patient verbalized understanding that should progress or compliance be lacking, we will contact the referring physician for further consultation to address and attempt to establish alternate treatment strategies as necessary and/or possibly discharge. Therapist Signature: Reji Doe \"BJ\" Azeem Yi DPT, Cert. MDT, Cert. DN, Cert. SMT Date: 7/0/9565   Certification Period: n/a Time: 12:23 PM   ===========================================================================================  I certify that the above Physical Therapy Services are being furnished while the patient is under my care. I agree with the treatment plan and certify that this therapy is necessary. Physician Signature:        Date:       Time:     Please sign and return to [facility] or you may fax the signed copy to [phone]. Thank you.

## 2019-02-05 NOTE — PROGRESS NOTES
PHYSICAL THERAPY - DAILY TREATMENT NOTE  Patient Name: Dorys Solano        Date: 2019  : 1975   [x]  Patient  Verified  Visit #:   1     Insurance: Payor: Ganesh Heath / Plan: [address] [place] [address] / Product Type: PPO /      In time:   10:00          Out time:   11:00 Total Treatment Time (min):   60   Medicare Time Tracking (below)   Total Timed Codes (min):  23 1:1 Treatment Time:  23     SUBJECTIVE    Pain Level (on 0 to 10 scale):  8  / 10   Medication Changes/New allergies or changes in medical history, any new surgeries or procedures? []  No    []  Yes   If yes, update Summary List:    Subjective Functional Status/Changes:  []  No changes reported       HISTORY    Present Symptoms: CLBP    Present since:    [] Improving []  Unchanging []  Worsening        Commenced as a result of: episodic, fluctuating in pain levels. Worsened over the past year from lifting heavy things as a kush overnight. Reports radiating pain down BLE to lateral aspect of thighs, Right > Left. Has gone down past knees.     or  []  No apparent reason    Symptoms at onset:  [x] Back []  Thigh []  Leg     Constant symptoms:  [] Back []  Thigh []  Leg       Intermittent symptoms:  [x] Back [x]  Thigh [x]  Leg     Worse:  [x] Bending [x]  Walking -  []  Lying   [] Sitting/ Rising [x]  Standing- \"a few hours\" [] When still   []  Am/ as the day progresses/ pm []  On the move []Other:lifting     Other:  Walks 20-30 blocks to work    Better:   [] Bending []  Walking []  Lying   [] Sitting/ Rising []  Standing [] When still   []  Am/ as the day progresses/ pm []  On the move []Other:       Other:medication    Disturbed sleep: [] No    [x] Yes       Sleeping Postures:  []  Prone [x]  Supine   [x]  R side [x] L side      Surface:  [] Soft [x]  Firm []  Medium/tempurpedic     Current Treatment: medication, exercise, salt bath      Number of previous episodes: chronic LBP  Year of first episode:2012/13    Previous Medical History: See pink      Previous Treatments: chiropractor 2013    SPECIFIC QUESTIONS    [] Cough []  Deep breath [x]  -ve   [] Sneeze []  + ve      Bladder:   [x] Normal []  Abnormal     Gait:  [x] Normal []  Abnormal     General Health:  [] Good [x]  Fair []  Poor     Imaging:   [x] Yes []  No       Results if yes:L/S narrowing     Night pain:   [] Yes [x]  No         Recent or major surgery:  [] Yes [x]  No       Work:  Mechanical Stresses: [employer]     Leisure: Mechanical Stresses: \"some exercise\"     Functional disability from present episode: intermittent flexion deformity, stair intolerance, transfer intolerance, getting down to the floor to stock bottom shelf, sleeping. Donning/doffing    Functional disability score- See FS FOTO score       OBJECTIVE  EXAMINATION  Posture:  Sitting  [] Good [x]  Fair []  Poor     Standing:  [x] Good []  Fair []  Poor     Lordosis:  [x] Red []  Acc []  Normal       Lateral shift:  [] Right []  Left []  Nil   -Relevant ? no    Correction of posture:  [] Better [] Worse []  No effect     Other observations:      Neurological:    Motor deficit:   L(0-5) R (0-5) Pain   Hip Flexion (L1,2) 4- 4- []   Knee Extension (L3,4) 4- 4- []   Ankle Dorsiflexion (L4) 4- 4- []   Great Toe Extension (L5) 4- 4- []   Ankle Plantarflexion (S1) 4- 4- []   Knee Flexion (S1,2) 4- 4- []   Gluteus Kaleb 4- 4- []   Gluteus Medius 4- 4- []     Reflexes:WNL   Sensory deficit: WNL  Dural signs:Sciatic Nerve: [place] String:       [] R  [] +    [] -    [] L    [] +    [] -      ASLR:              [] R  [] +    [] -    [] L    [] +    [] -  0-35= no dural movement, tension onset to sciatic roots at 35 degrees. Sciatic roots tense over intervertebral discs 35-70 degrees. > 70 degrees practically no further deformation of roots.      Bragard's Sign [] R  [] +    [] -    [] L    [] +    [] - (Take off flexion, then dorsiflex)      Alexis's Sign [] R  [] +    [] -    [] L    [] +    [] - (Take off flexion, then have patient flex neck)    Slump test :       [] R   [] +    [] -    [] L    [] +    [] - (sensitivity 44-70%; specificity 23-66%)    Movement loss Riccardo Mod Min Nil Pain   Flexion  x   Right LE   Extension x    L/S   Side Gliding R  x   L/S   Side Gliding L  x   L/S       Test movements:  Describe effects on present pain- During: produces, abolishes, increases, decreases, no effect, centralising, peripheralising. After: better, worse, no better, no worse, no effect, centralised, peripheralised. Symptoms during testing Symptoms after testing Inc. ROM Dec. ROM No Effect   Pretest sx's  Standing Flexion deformity, radiating pain to right LE to above knee. FIS **increase* W []   []   []     Rep FIS increase W []   []   []     EIS C B []   []   []     Rep EIS C B, C right lateral hip  Second set: NBNW []   []   []     Pretest sx's lying Right lateral thigh       JOANN increase W []   []   []     Rep JOANN increase W []   []   []     EIL C B []   []   []     Rep EIL C C  []   []   []         Static Tests:  Sitting slouched:  Sitting erect:  Standing slouched:  Standing erect:  Lying prone in extension:  Long sitting:    Other Tests:Therapeutic Procedures:  Min Procedure Specifics + Rationale   n/a [x]  Patient Education (performed throughout session) [x] Review HEP    [] Progressed/Changed HEP based on:   [] proper performance and advancement of Therex/TA   [] reduction in pain level    [] increased functional capacity       [] change in directional preference   15(15) [x] Therapeutic Exercise   [x]  See Flowsheet   Rationale: increase ROM and increase strength to improve the patients ability to participate in ADL's    8(8) [x] Therapeutic Activity   [x]  See Flowsheet  Posture, positioning, and transfers  Rationale:  To improve safety, proprioception, coordination, and efficiency with tasks         Post Treatment Pain Level (on 0 to 10) scale:   C, \"tight\"  / 10 ASSESSMENT      min Patient Education:  YES  Reviewed HEP   []  Progressed/Changed HEP based on:          ASSESSMENT    Assessment  Justification for Eval Code Complexity:  Patient History (low 0, mod 1-2, high 3-4): high  Examination (low 1-2, mod 3+, high 4+): low  Clinical Presentation (low stable or uncomplicated, mod evolving or changing, high unstable or unpredictable): low  Clinical Decision Making (low , mod 26-74, high 1-25): FOTO mod      []  See Progress Note/Recertification   Patient will continue to benefit from skilled PT services to : SEE POC   Progress toward goals / Updated goals:    See POC     PLAN    []  Upgrade activities as tolerated YES Continue plan of care   []  Discharge due to :    [x]  Other: Initiate POC     Therapist: Jose Alfredo Montalvo \"FAUSTO\" MICHA Garcia, Cert. MDT, Cert. DN, Cert.  SMT    Date: 2/5/2019 Time: 10:05 AM

## 2019-02-12 ENCOUNTER — HOSPITAL ENCOUNTER (OUTPATIENT)
Dept: PHYSICAL THERAPY | Age: 44
Discharge: HOME | End: 2019-02-12
Payer: COMMERCIAL

## 2019-02-12 PROCEDURE — 97530 THERAPEUTIC ACTIVITIES: CPT

## 2019-02-12 PROCEDURE — 97110 THERAPEUTIC EXERCISES: CPT

## 2019-02-12 NOTE — PROGRESS NOTES
PHYSICAL THERAPY - DAILY TREATMENT NOTE    Patient Name: Shay Posadas        Date: 2019  : 1975   yes Patient  Verified  Visit #:     Insurance: Payor: BLUE CROSS / Plan: [address] [place] [address] / Product Type: PPO /      In time: 733 Out time: 816   Total Treatment Time: 43     Medicare/[facility] Time Tracking (below)   Total Timed Codes (min):  33 1:1 Treatment Time:  33     TREATMENT AREA =  Low back pain [M54.5]    SUBJECTIVE  Pain Level (on 0 to 10 scale):  8  / 10   Medication Changes/New allergies or changes in medical history, any new surgeries or procedures?    no  If yes, update Summary List   Subjective Functional Status/Changes:  []  No changes reported     \"I just finished work I am tired and we had a rough day, I hurt down to the Left knee, sometimes it is in the Right LE.           OBJECTIVE  Modalities Rationale:     decrease inflammation and decrease pain to improve patient's ability to perform ADLs/ bending/stooping/ lifting/prolong sitting, stding and amb/ stairs with ease      min [] Estim, type/location:                                      []  att     []  unatt     []  w/US     []  w/ice    []  w/heat    min []  Mechanical Traction: type/lbs                   []  pro   []  sup   []  int   []  cont    []  before manual    []  after manual    min []  Ultrasound, settings/location:      min []  Iontophoresis w/ dexamethasone, location:                                               []  take home patch       []  in clinic   10 min [x]  Ice     []  Heat    location/position: prone with wedge under LEs    min []  Vasopneumatic Device, press/temp:     min []  Other:    [x] Skin assessment post-treatment (if applicable):    [x]  intact    []  redness- no adverse reaction     []redness - adverse reaction:        25 min Therapeutic Exercise:  [x]  See flow sheet   Rationale:      increase ROM, increase strength and improve coordination to improve the patients ability to perform ADLs/ bending/stooping/ lifting/prolong sitting, stding and amb/ stairs with ease        8 min Therapeutic Activity: [x]  See flow sheet  postural/bed mobility training  sit <>std without UE   Rationale:    increase ROM and increase strength to improve the patients ability to perform ADLs/ bending/stooping/ lifting/prolong sitting, stding and amb/ stairs with ease         Billed With/As:   [] TE   [x] TA   [] Neuro   [] Self Care Patient Education: [x] Review HEP    [x] Progressed/Changed HEP based on:   [x] positioning   [x] body mechanics   [x] transfers   [x] heat/ice application    [x] other: Pt ed on importance and benefits of compliance with HEP, core strength/stability and proper posture; pt verbalized understanding     Other Objective/Functional Measures:  prolong prone lying with bed elevated- P/B, decrease p! SCOTT over bar- P/B; abolished p!, maintained for remainder of session  VCs + demo to perform proper technique for TE    Initiated TE per flowsheet without c/o P! Reviewed proper bed mobility, sleeping positions, lifting techniques and importance and benefits of a neutral spine     Post Treatment Pain Level (on 0 to 10) scale:   0  / 10     ASSESSMENT  Assessment/Changes in Function:   Progressed there-ex without c/o increase p!  demos proper bed mobility with VCs  demos p! free proper sit <>std without UE with instruction    []  See Progress Note/Recertification   Patient will continue to benefit from skilled PT services to modify and progress therapeutic interventions, address functional mobility deficits, address ROM deficits, address strength deficits, analyze and address soft tissue restrictions, analyze and cue movement patterns, analyze and modify body mechanics/ergonomics, assess and modify postural abnormalities and instruct in home and community integration to attain remaining goals.    Progress toward goals / Updated goals:  Pt's first visit since IE, no noted progress        PLAN  [] Upgrade activities as tolerated yes Continue plan of care   []  Discharge due to :    []  Other:      Therapist: Edis Delgado PTA    Date: 2/12/2019 Time: 8:08 AM     Future Appointments   Date Time Provider Alin Mcfadden   2/15/2019  8:00 AM Ian Sanchez [facility]   2/19/2019  8:00 AM Chanda Franco [facility]   2/22/2019  8:30 AM Ian Sanchez PT [facility]   2/26/2019  8:00 AM Chanda Franco [facility]   [facility]  [facility]

## 2019-02-15 ENCOUNTER — HOSPITAL ENCOUNTER (OUTPATIENT)
Dept: PHYSICAL THERAPY | Age: 44
Discharge: HOME | End: 2019-02-15
Payer: COMMERCIAL

## 2019-02-15 PROCEDURE — 97110 THERAPEUTIC EXERCISES: CPT

## 2019-02-15 NOTE — PROGRESS NOTES
PHYSICAL THERAPY - DAILY TREATMENT NOTE    Patient Name: Bony File        Date: 2/15/2019  : 1975   YES Patient  Verified  Visit #:   3   of   17  Insurance: Payor: Susan Mortensen / Plan: [address] [place] [address] / Product Type: PPO /      In time: 7:40early Out time: 8:35   Total Treatment Time: 55     Medicare/[facility] Time Tracking (below)   Total Timed Codes (min):  55 1:1 Treatment Time:  45     TREATMENT AREA = Low back pain [M54.5]    SUBJECTIVE    Pain Level (on 0 to 10 scale):  8  / 10   Medication Changes/New allergies or changes in medical history, any new surgeries or procedures? NO    If yes, update Summary List   Subjective Functional Status/Changes:  []  No changes reported     \"Just got off work again. I get a 13' break every 2 hours and that's when I bend backwards.  \"          OBJECTIVE     Therapeutic Procedures:  Min Procedure Specifics + Rationale   n/a [x]  Patient Education (performed throughout session) [x] Review HEP    [] Progressed/Changed HEP based on:   [] proper performance and advancement of Therex/TA   [] reduction in pain level    [] increased functional capacity       [] change in directional preference   45(45) [x] Therapeutic Exercise   [x]  See Flowsheet   Rationale: increase ROM and increase strength to improve the patients ability to participate in ADL's      Modality rationale: decrease inflammation, decrease pain, increase tissue extensibility and increase muscle contraction/control to improve the patients ability to perform ADL's with greater ease       Min Type Additional Details   10 [x]  Cold Pack   [] pre-LEXA      [x] post-LEXA  []  Ice massage Location:L/S    [] supine              [x] prone  [x] legs elevated    [] legs flat   [] sitting   [x] Skin assessment post-treatment:  [x]intact [x]redness- no adverse reaction       []redness - adverse reaction:     Other Objective/Functional Measures:    Performed REIL throughout treatment b/w sets/reps/exercises as needed as prophylaxis and symptoms management. Advised patient to favor REIL over SCOTT whenever possible     Post Treatment Pain Level (on 0 to 10) scale:   0  / 10     ASSESSMENT    Assessment/Changes in Function:       Improvement in L/S ROM extension as patient progressed and increased in sets/reps         Patient will continue to benefit from skilled PT services to modify and progress therapeutic interventions, address functional mobility deficits, address ROM deficits, address strength deficits, analyze and address soft tissue restrictions, analyze and cue movement patterns, analyze and modify body mechanics/ergonomics and instruct in home and community integration  to attain remaining goals   Progress toward goals / Updated goals:    Maintaining HEP compliance     PLAN    [x]  Upgrade activities as tolerated  [x]  Update interventions per flow sheet YES Continue plan of care   []  Discharge due to :    []  Other:      Therapist: Angelina Green \"FAUSTO\" PAN StoreyT, Cert. MDT, Cert. DN, Cert.  SMT    Date: 2/15/2019 Time: 7:53 AM     Future Appointments   Date Time Provider Alin Mcfadden   2/15/2019  8:00 AM Jake Perez [facility]   2/19/2019  8:00 AM India Suazo [facility]   2/22/2019  8:30 AM Jake Perez PT [facility]   2/26/2019  8:00 AM India Suazo [facility]   2/28/2019  8:30 AM Eulogio [employer]

## 2019-02-19 ENCOUNTER — HOSPITAL ENCOUNTER (OUTPATIENT)
Dept: PHYSICAL THERAPY | Age: 44
Discharge: HOME | End: 2019-02-19
Payer: COMMERCIAL

## 2019-02-19 PROCEDURE — 97110 THERAPEUTIC EXERCISES: CPT

## 2019-02-19 PROCEDURE — 97530 THERAPEUTIC ACTIVITIES: CPT

## 2019-02-19 NOTE — PROGRESS NOTES
PHYSICAL THERAPY - DAILY TREATMENT NOTE    Patient Name: Dorys Solano        Date: 2019  : 1975   yes Patient  Verified  Visit #:     Insurance: Payor: BLUE CROSS / Plan: [address] [place] [address] / Product Type: PPO /      In time: 800 Out time: 838   Total Treatment Time: 38     Medicare/[facility] Time Tracking (below)   Total Timed Codes (min):  28 1:1 Treatment Time:  28     TREATMENT AREA =  Low back pain [M54.5]    SUBJECTIVE  Pain Level (on 0 to 10 scale): 5 / 10   Medication Changes/New allergies or changes in medical history, any new surgeries or procedures?    no  If yes, update Summary List   Subjective Functional Status/Changes:  []  No changes reported     \"I feel it on the right side, I just got off work so walking on that concrete hurts me I think\"         OBJECTIVE  Modalities Rationale:     decrease inflammation and decrease pain to improve patient's ability to perform ADLs/ bending/stooping/ lifting/prolong sitting, stding and amb/ stairs with ease      min [] Estim, type/location:                                      []  att     []  unatt     []  w/US     []  w/ice    []  w/heat    min []  Mechanical Traction: type/lbs                   []  pro   []  sup   []  int   []  cont    []  before manual    []  after manual    min []  Ultrasound, settings/location:      min []  Iontophoresis w/ dexamethasone, location:                                               []  take home patch       []  in clinic   10 min [x]  Ice     []  Heat    location/position: prone with wedge under LEs    min []  Vasopneumatic Device, press/temp:     min []  Other:    [x] Skin assessment post-treatment (if applicable):    [x]  intact    []  redness- no adverse reaction     []redness - adverse reaction:        18 min Therapeutic Exercise:  [x]  See flow sheet   Rationale:      increase ROM, increase strength and improve coordination to improve the patients ability to perform ADLs/ bending/stooping/ lifting/prolong sitting, stding and amb/ stairs with ease        10 min Therapeutic Activity: [x]  See flow sheet  postural/bed mobility training  mini squats with 5# KB  step ups on 6\"   Rationale:    increase ROM and increase strength to improve the patients ability to perform ADLs/ bending/stooping/ lifting/prolong sitting, stding and amb/ stairs with ease         Billed With/As:   [] TE   [x] TA   [] Neuro   [] Self Care Patient Education: [x] Review HEP    [x] Progressed/Changed HEP based on:   [x] positioning   [x] body mechanics   [x] transfers   [x] heat/ice application    [x] other: Pt ed on importance and benefits of compliance with HEP, core strength/stability and proper posture; pt verbalized understanding     Other Objective/Functional Measures:  5/10 p! level on right L/s     prolong prone lying with bed elevated- P/B, decrease p! to 2/10  REIL on red wedge- P/B decrease p!; maintained for remainder of Tx    Initiated mini squats with 5#KB, and step ups without c/o P! VCs to increase CHRISTINE with squats    Able to follow instructions to perform slw ecc lowering with 6\" step ups with 1 UE support    Post Treatment Pain Level (on 0 to 10) scale:   0  / 10     ASSESSMENT  Assessment/Changes in Function:   Progressed there-ex without c/o increase p!  demos proper, pain free squat with 5# KB   []  See Progress Note/Recertification   Patient will continue to benefit from skilled PT services to modify and progress therapeutic interventions, address functional mobility deficits, address ROM deficits, address strength deficits, analyze and address soft tissue restrictions, analyze and cue movement patterns, analyze and modify body mechanics/ergonomics, assess and modify postural abnormalities and instruct in home and community integration to attain remaining goals. Progress toward goals / Updated goals: · Short Term Goals: To be accomplished in  2-3  weeks:  1.  Patient to be adherent to HEP to facilitate pain control with ADL's. MET  2. Patient to centralize BLE sx to level of L/S to demonstrate effectiveness of directional preference exercises and decrease risk of LE dysfunction . MET  3. Patient to report no sleep disturbance secondary to LBP and radiculopathy. 4. Patient to don/doff shoes with minimal difficulty. progressing, able to perform supine piriformis str without difficulty  · Long Term Goals: To be accomplished in  4-6  weeks:  1. Patient to be Safe and Independent with HEP to self-manage/prevent symptoms after DC. 2. Patient to demonstrate full L/S AROM in all planes without peripheralization to indicate return to function. 3. Patient to increase FS FOTO score to > 51 to indicate increased functional independence. 4. Patient to demonstrate safe floor <-->stand transfers without exacerbation of LBP to facilitate work activity tolerance.          PLAN  []  Upgrade activities as tolerated yes Continue plan of care   []  Discharge due to :    []  Other:      Therapist: Erin Mckenzie PTA    Date: 2/19/2019 Time: 8:08 AM     Future Appointments   Date Time Provider Alin Mcfadden   2/22/2019  8:30 AM Jillian Zapien PT [facility]   2/26/2019  8:00 AM Dulce Olivera PTA [facility]   2/28/2019  8:30 AM Ian [facility]

## 2019-02-22 ENCOUNTER — HOSPITAL ENCOUNTER (OUTPATIENT)
Dept: PHYSICAL THERAPY | Age: 44
Discharge: HOME | End: 2019-02-22
Payer: COMMERCIAL

## 2019-02-22 PROCEDURE — 97110 THERAPEUTIC EXERCISES: CPT

## 2019-02-22 NOTE — PROGRESS NOTES
PHYSICAL THERAPY - DAILY TREATMENT NOTE    Patient Name: Michelle Osborn        Date: 2019  : 1975   YES Patient  Verified  Visit #:      17  Insurance: Payor: Mariel Aleman / Plan: [address] [place] [address] / Product Type: PPO /      In time: 8:25 Out time: 9:05   Total Treatment Time: 40(38)     Medicare Time Tracking (below)   Total Timed Codes (min):  40 1:1 Treatment Time:  38     TREATMENT AREA = Low back pain [M54.5]    SUBJECTIVE    Pain Level (on 0 to 10 scale):  0  / 10   Medication Changes/New allergies or changes in medical history, any new surgeries or procedures? NO    If yes, update Summary List   Subjective Functional Status/Changes:  []  No changes reported     \"I worked last night but had no pain. Been doing all of my homework. \"          OBJECTIVE  Therapeutic Procedures:  Min Procedure Specifics + Rationale   n/a [x]  Patient Education (performed throughout session) [x] Review HEP    [] Progressed/Changed HEP based on:   [] proper performance and advancement of Therex/TA   [] reduction in pain level    [] increased functional capacity       [] change in directional preference   40(38) [x] Therapeutic Exercise   [x]  See Flowsheet   Rationale: increase ROM and increase strength to improve the patients ability to participate in ADL's        Other Objective/Functional Measures:    Increased reps/sets/resistance per flow sheet. Performed SCOTT/REIL throughout treatment b/w sets/reps/exercises as needed as prophylaxis and symptoms management.        Post Treatment Pain Level (on 0 to 10) scale:   0  / 10     ASSESSMENT    Assessment/Changes in Function:     Derangement reduction  improving in carryover         Patient will continue to benefit from skilled PT services to modify and progress therapeutic interventions, address functional mobility deficits, address ROM deficits, address strength deficits, analyze and address soft tissue restrictions, analyze and cue movement patterns, analyze and modify body mechanics/ergonomics and instruct in home and community integration  to attain remaining goals   Progress toward goals / Updated goals:    Progressing well in recovery and maintenance of HEP     PLAN    [x]  Upgrade activities as tolerated  [x]  Update interventions per flow sheet YES Continue plan of care   []  Discharge due to :    []  Other:      Therapist: Nelly Come \"BJ\" MICHA Garcia, Cert. MDT, Cert. DN, Cert.  SMT    Date: 2/22/2019 Time: 8:34 AM     Future Appointments   Date Time Provider Alin Mcfadden   2/26/2019  8:00 AM Stephanie [facility]   2/28/2019  8:30 AM Jessica [facility]

## 2019-02-26 ENCOUNTER — HOSPITAL ENCOUNTER (OUTPATIENT)
Dept: PHYSICAL THERAPY | Age: 44
Discharge: HOME | End: 2019-02-26
Payer: COMMERCIAL

## 2019-02-26 PROCEDURE — 97110 THERAPEUTIC EXERCISES: CPT

## 2019-02-26 PROCEDURE — 97530 THERAPEUTIC ACTIVITIES: CPT

## 2019-02-26 NOTE — PROGRESS NOTES
PHYSICAL THERAPY - DAILY TREATMENT NOTE    Patient Name: Javier Koroma        Date: 2019  : 1975   yes Patient  Verified  Visit #:     Insurance: Payor: Lucie Wilkerson / Plan: [address] [place] [address] / Product Type: PPO /      In time: 804 Out time: 900   Total Treatment Time: 56     Medicare/[facility] Time Tracking (below)   Total Timed Codes (min):  46 1:1 Treatment Time:  30     TREATMENT AREA =  Low back pain [M54.5]    SUBJECTIVE  Pain Level (on 0 to 10 scale): 0 / 10   Medication Changes/New allergies or changes in medical history, any new surgeries or procedures?    no  If yes, update Summary List   Subjective Functional Status/Changes:  []  No changes reported     \"I feel good today, its not hurting.  I been lifting at work without pain\"         OBJECTIVE  Modalities Rationale:     decrease inflammation and decrease pain to improve patient's ability to perform ADLs/ bending/stooping/ lifting/prolong sitting, stding and amb/ stairs with ease      min [] Estim, type/location:                                      []  att     []  unatt     []  w/US     []  w/ice    []  w/heat    min []  Mechanical Traction: type/lbs                   []  pro   []  sup   []  int   []  cont    []  before manual    []  after manual    min []  Ultrasound, settings/location:      min []  Iontophoresis w/ dexamethasone, location:                                               []  take home patch       []  in clinic   10 min [x]  Ice     []  Heat    location/position: prone with wedge under LEs    min []  Vasopneumatic Device, press/temp:     min []  Other:    [x] Skin assessment post-treatment (if applicable):    [x]  intact    []  redness- no adverse reaction     []redness - adverse reaction:        36 min Therapeutic Exercise:  [x]  See flow sheet   Rationale:      increase ROM, increase strength and improve coordination to improve the patients ability to perform ADLs/ bending/stooping/ lifting/prolong sitting, stding and amb/ stairs with ease        10 min Therapeutic Activity: [x]  See flow sheet  postural/bed mobility training  mini squats with 5# KB  step ups on 6\"   Rationale:    increase ROM and increase strength to improve the patients ability to perform ADLs/ bending/stooping/ lifting/prolong sitting, stding and amb/ stairs with ease         Billed With/As:   [] TE   [x] TA   [] Neuro   [] Self Care Patient Education: [x] Review HEP    [x] Progressed/Changed HEP based on:   [x] positioning   [x] body mechanics   [x] transfers   [x] heat/ice application    [x] other: Pt ed on importance and benefits of compliance with HEP, core strength/stability and proper posture; pt verbalized understanding     Other Objective/Functional Measures:  VCs + demo to perform proper technique for TE    Initiated doorway str without c/o p!  demos proper pain free squat > 45 degs      Post Treatment Pain Level (on 0 to 10) scale:   0  / 10     ASSESSMENT  Assessment/Changes in Function:   Progressed there-ex without c/o increase p!  pain free for all TE   []  See Progress Note/Recertification   Patient will continue to benefit from skilled PT services to modify and progress therapeutic interventions, address functional mobility deficits, address ROM deficits, address strength deficits, analyze and address soft tissue restrictions, analyze and cue movement patterns, analyze and modify body mechanics/ergonomics, assess and modify postural abnormalities and instruct in home and community integration to attain remaining goals. Progress toward goals / Updated goals: · Short Term Goals: To be accomplished in  2-3  weeks:  1. Patient to be adherent to HEP to facilitate pain control with ADL's. MET  2. Patient to centralize BLE sx to level of L/S to demonstrate effectiveness of directional preference exercises and decrease risk of LE dysfunction . MET  3. Patient to report no sleep disturbance secondary to LBP and radiculopathy.    4. Patient to don/doff shoes with minimal difficulty. progressing, able to perform supine piriformis str without difficulty  · Long Term Goals: To be accomplished in  4-6  weeks:  1. Patient to be Safe and Independent with HEP to self-manage/prevent symptoms after DC. 2. Patient to demonstrate full L/S AROM in all planes without peripheralization to indicate return to function. 3. Patient to increase FS FOTO score to > 51 to indicate increased functional independence. 4. Patient to demonstrate safe floor <-->stand transfers without exacerbation of LBP to facilitate work activity tolerance.       PLAN  []  Upgrade activities as tolerated yes Continue plan of care   []  Discharge due to :    []  Other:      Therapist: Morgan Bentley PTA    Date: 2/26/2019 Time: 2:30 PM     Future Appointments   Date Time Provider Alin Mcfadden   2/28/2019  8:30 AM [facility]   3/4/2019  8:00 AM Hanane Pack [facility]   3/6/2019  8:00 AM Adan Kaufman PT [facility]   3/11/2019  8:00 AM Hanane Pack [facility]   3/13/2019  8:00 AM Adan Kaufman PT [facility]

## 2019-02-28 ENCOUNTER — HOSPITAL ENCOUNTER (OUTPATIENT)
Dept: PHYSICAL THERAPY | Age: 44
Discharge: HOME | End: 2019-02-28
Payer: COMMERCIAL

## 2019-02-28 PROCEDURE — 97110 THERAPEUTIC EXERCISES: CPT

## 2019-02-28 PROCEDURE — 97530 THERAPEUTIC ACTIVITIES: CPT

## 2019-02-28 NOTE — PROGRESS NOTES
PHYSICAL THERAPY - DAILY TREATMENT NOTE    Patient Name: Rossy Portal        Date: 2019  : 1975   yes Patient  Verified  Visit #:    Insurance: Payor: Nayely Dumont / Plan: [address] [place] [address] / Product Type: PPO /      In time:  831 Out time: 910   Total Treatment Time: 39     Medicare/[employer] Time Tracking (below)   Total Timed Codes (min):  29 1:1 Treatment Time:  25     TREATMENT AREA =  Low back pain [M54.5]    SUBJECTIVE  Pain Level (on 0 to 10 scale): 0 / 10   Medication Changes/New allergies or changes in medical history, any new surgeries or procedures?    no  If yes, update Summary List   Subjective Functional Status/Changes:  []  No changes reported     \"I  was actually not hurting in the last few days.  I am happy about it\"         OBJECTIVE      29 min Therapeutic Exercise:  [x]  See flow sheet   Rationale:      increase ROM, increase strength and improve coordination to improve the patients ability to perform ADLs/ bending/stooping/ lifting/prolong sitting, stding and amb/ stairs with ease        10 min Therapeutic Activity: [x]  See flow sheet  postural/bed mobility training  mini squats with 5# KB  step ups on 6\" with 5# KB    Rationale:    increase ROM and increase strength to improve the patients ability to perform ADLs/ bending/stooping/ lifting/prolong sitting, stding and amb/ stairs with ease         Billed With/As:   [] TE   [x] TA   [] Neuro   [] Self Care Patient Education: [x] Review HEP    [x] Progressed/Changed HEP based on:   [x] positioning   [x] body mechanics   [x] transfers   [x] heat/ice application    [x] other: Pt ed on importance and benefits of compliance with HEP, core strength/stability and proper posture; pt verbalized understanding     Other Objective/Functional Measures:  VCs + demo to perform proper technique for TE  initiated FR up wall for t/s ext , and added 5# KB to step ups without difficulty or pain  demos proper posture 75% of session  steph good quad control/activation with step ups and 5# KB  discussed d/c with HEP in 4 visits   Post Treatment Pain Level (on 0 to 10) scale:   0  / 10     ASSESSMENT  Assessment/Changes in Function:   Progressed there-ex without c/o increase p!  pain free for all TE  no limitations    []  See Progress Note/Recertification   Patient will continue to benefit from skilled PT services to modify and progress therapeutic interventions, address functional mobility deficits, address ROM deficits, address strength deficits, analyze and address soft tissue restrictions, analyze and cue movement patterns, analyze and modify body mechanics/ergonomics, assess and modify postural abnormalities and instruct in home and community integration to attain remaining goals. Progress toward goals / Updated goals: · Short Term Goals: To be accomplished in  2-3  weeks:  1. Patient to be adherent to HEP to facilitate pain control with ADL's. MET  2. Patient to centralize BLE sx to level of L/S to demonstrate effectiveness of directional preference exercises and decrease risk of LE dysfunction . MET  3. Patient to report no sleep disturbance secondary to LBP and radiculopathy. 4. Patient to don/doff shoes with minimal difficulty. progressing, able to perform supine piriformis str without difficulty  · Long Term Goals: To be accomplished in  4-6  weeks:  1. Patient to be Safe and Independent with HEP to self-manage/prevent symptoms after DC. 2. Patient to demonstrate full L/S AROM in all planes without peripheralization to indicate return to function. 3. Patient to increase FS FOTO score to > 51 to indicate increased functional independence. 4. Patient to demonstrate safe floor <-->stand transfers without exacerbation of LBP to facilitate work activity tolerance.       PLAN  []  Upgrade activities as tolerated yes Continue plan of care   []  Discharge due to :    []  Other:      Therapist: Madhavi Liao, CUBA    Date: 2/28/2019 Time: 2:30 PM     Future Appointments   Date Time Provider Alin Mcfadden   3/4/2019  8[facility] [address]   3/6/2019  8:00 AM Chad Luis PT [address]   3/11/2019  8:00 AM Rigoberto Schmitt PTA [facility] AT [address]   3/13/2019  8:00 AM Chad Luis PT [address]

## 2019-03-04 ENCOUNTER — HOSPITAL ENCOUNTER (OUTPATIENT)
Dept: PHYSICAL THERAPY | Age: 44
Discharge: HOME | End: 2019-03-04
Payer: COMMERCIAL

## 2019-03-04 PROCEDURE — 97530 THERAPEUTIC ACTIVITIES: CPT

## 2019-03-04 PROCEDURE — 97110 THERAPEUTIC EXERCISES: CPT

## 2019-03-04 NOTE — PROGRESS NOTES
PHYSICAL THERAPY - DAILY TREATMENT NOTE    Patient Name: Mercy Martinez        Date: 3/4/2019  : 1975   yes Patient  Verified  Visit #:  8   17  Insurance: Payor: Elois Schlatter / Plan: [address] [place] [address] / Product Type: PPO /      In time:  800 Out time: 900   Total Treatment Time: 60     Medicare/[employer] Time Tracking (below)   Total Timed Codes (min):  50 1:1 Treatment Time:  30     TREATMENT AREA =  Low back pain [M54.5]    SUBJECTIVE  Pain Level (on 0 to 10 scale): 0 / 10   Medication Changes/New allergies or changes in medical history, any new surgeries or procedures?    no  If yes, update Summary List   Subjective Functional Status/Changes:  []  No changes reported     \"I  came down on my knee wrong off the step ladder, and now my back is stiff.  I turned to fast. I was so proud of my self with my back\"         OBJECTIVE      38 min Therapeutic Exercise:  [x]  See flow sheet   Rationale:      increase ROM, increase strength and improve coordination to improve the patients ability to perform ADLs/ bending/stooping/ lifting/prolong sitting, stding and amb/ stairs with ease        12 min Therapeutic Activity: [x]  See flow sheet  postural/bed mobility training  17# box lift   step ups on 6\" with 5# KB  stair neg    Rationale:    increase ROM and increase strength to improve the patients ability to perform ADLs/ bending/stooping/ lifting/prolong sitting, stding and amb/ stairs with ease         Billed With/As:   [] TE   [x] TA   [] Neuro   [] Self Care Patient Education: [x] Review HEP    [x] Progressed/Changed HEP based on:   [x] positioning   [x] body mechanics   [x] transfers   [x] heat/ice application    [x] other: Pt ed on importance and benefits of compliance with HEP, core strength/stability and proper posture; pt verbalized understanding     Other Objective/Functional Measures:  KYLER-P/B> REIL> P/B> SCOTT-B/B abolished p!; maintained for remainder of session   VCs + demo to perform proper technique for TE  initiated open book and box lift without c/o p!  demos proper, p! free 17# box lift from low mat <>chest<>pivot <>floor  x 5  negotiated up and down 3 steps x 5 without difficulty, ascending with no UE assist, and 1 UE assist with descending, with c/o slight knee pain Left> right   Post Treatment Pain Level (on 0 to 10) scale:   0  / 10     ASSESSMENT  Assessment/Changes in Function:   Progressed there-ex without c/o increase p!  able to abolish LBP with Rep L/s movement (MDT)    []  See Progress Note/Recertification   Patient will continue to benefit from skilled PT services to modify and progress therapeutic interventions, address functional mobility deficits, address ROM deficits, address strength deficits, analyze and address soft tissue restrictions, analyze and cue movement patterns, analyze and modify body mechanics/ergonomics, assess and modify postural abnormalities and instruct in home and community integration to attain remaining goals. Progress toward goals / Updated goals: · Short Term Goals: To be accomplished in  2-3  weeks:  1. Patient to be adherent to HEP to facilitate pain control with ADL's. MET  2. Patient to centralize BLE sx to level of L/S to demonstrate effectiveness of directional preference exercises and decrease risk of LE dysfunction . MET  3. Patient to report no sleep disturbance secondary to LBP and radiculopathy. 4. Patient to don/doff shoes with minimal difficulty. progressing, able to perform supine piriformis str without difficulty  · Long Term Goals: To be accomplished in  4-6  weeks:  1. Patient to be Safe and Independent with HEP to self-manage/prevent symptoms after DC. 2. Patient to demonstrate full L/S AROM in all planes without peripheralization to indicate return to function. 3. Patient to increase FS FOTO score to > 51 to indicate increased functional independence.    4. Patient to demonstrate safe floor <-->stand transfers without exacerbation of LBP to facilitate work activity tolerance.       PLAN  []  Upgrade activities as tolerated yes Continue plan of care   []  Discharge due to :    []  Other:      Therapist: Jermaine Araya PTA    Date: 3/4/2019 Time: 2:30 PM     Future Appointments   Date Time Provider Alin Mcfadden   3/6/2019  8:00 AM Cassy Perez PT [facility]   3/11/2019  8:00 AM Celene Carrel, PTA [facility]   3/13/2019  8:00 AM Cassy Perez PT [facility]

## 2019-03-06 ENCOUNTER — HOSPITAL ENCOUNTER (OUTPATIENT)
Dept: PHYSICAL THERAPY | Age: 44
Discharge: HOME | End: 2019-03-06
Payer: COMMERCIAL

## 2019-03-06 PROCEDURE — 97110 THERAPEUTIC EXERCISES: CPT

## 2019-03-06 NOTE — PROGRESS NOTES
PHYSICAL THERAPY - DAILY TREATMENT NOTE    Patient Name: Cinthya Rodríguez        Date: 3/6/2019  : 1975   YES Patient  Verified  Visit #:     Insurance: Payor: Mesha Rosas / Plan: [address] [place] [address] / Product Type: PPO /      In time: 7:55 Out time: 8:40   Total Treatment Time: 45     Medicare/[facility] Time Tracking (below)   Total Timed Codes (min):  45 1:1 Treatment Time:  38     TREATMENT AREA = Low back pain [M54.5]    SUBJECTIVE    Pain Level (on 0 to 10 scale):  0  / 10   Medication Changes/New allergies or changes in medical history, any new surgeries or procedures? NO    If yes, update Summary List   Subjective Functional Status/Changes:  []  No changes reported     \"I'm doing great. Haven't had any issues with my back since that last time I saw Gene Maximilian when I stepped wrong off that ladder. I did feel my knee last night though. \"          OBJECTIVE     Therapeutic Procedures:  Min Procedure Specifics + Rationale   n/a [x]  Patient Education (performed throughout session) [x] Review HEP    [] Progressed/Changed HEP based on:   [] proper performance and advancement of Therex/TA   [] reduction in pain level    [] increased functional capacity       [] change in directional preference   45(38) [x] Therapeutic Exercise   [x]  See Flowsheet   Rationale: increase ROM and increase strength to improve the patients ability to participate in ADL's      [x] Skin assessment post-treatment:  [x]intact [x]redness- no adverse reaction       []redness - adverse reaction:     Other Objective/Functional Measures: Added Hip 3-way  Increased reps/sets/resistance per flow sheet. Post Treatment Pain Level (on 0 to 10) scale:   0  / 10     ASSESSMENT    Assessment/Changes in Function:       Performed/participated in treatment well without complaints aside from muscular fatigue/deconditioning, indicating (+) response to current course of treatment to further improve functional status. Patient will continue to benefit from skilled PT services to modify and progress therapeutic interventions, address functional mobility deficits, address ROM deficits, address strength deficits, analyze and address soft tissue restrictions, analyze and cue movement patterns, analyze and modify body mechanics/ergonomics and instruct in home and community integration  to attain remaining goals   Progress toward goals / Updated goals:    Progressing towards DC     PLAN    [x]  Upgrade activities as tolerated  [x]  Update interventions per flow sheet YES Continue plan of care   []  Discharge due to :    []  Other:      Therapist: Stephanie Berrios \"BJ\" MICHA Garcia, Cert. MDT, Cert. DN, Cert.  SMT    Date: 3/6/2019 Time: 8:02 AM     Future Appointments   Date Time Provider Alin Mcfadden   3/11/2019  8:00 AM Rigoberto [facility]   [facility]13/2019  [facility]

## 2019-03-11 ENCOUNTER — HOSPITAL ENCOUNTER (OUTPATIENT)
Dept: PHYSICAL THERAPY | Age: 44
Discharge: HOME | End: 2019-03-11
Payer: COMMERCIAL

## 2019-03-11 PROCEDURE — 97530 THERAPEUTIC ACTIVITIES: CPT

## 2019-03-11 PROCEDURE — 97110 THERAPEUTIC EXERCISES: CPT

## 2019-03-11 NOTE — PROGRESS NOTES
PHYSICAL THERAPY - DAILY TREATMENT NOTE    Patient Name: Mynor Ohs        Date: 3/11/2019  : 1975   yes Patient  Verified  Visit #:  10 of  17  Insurance: Payor: Vania Herrera / Plan: [address] [place] [address] / Product Type: PPO /      In time:  800 Out time: 845   Total Treatment Time: 45     Medicare/[facility] Time Tracking (below)   Total Timed Codes (min): 45 1:1 Treatment Time:  30     TREATMENT AREA =  Low back pain [M54.5]    SUBJECTIVE  Pain Level (on 0 to 10 scale): 0 / 10   Medication Changes/New allergies or changes in medical history, any new surgeries or procedures?    no  If yes, update Summary List   Subjective Functional Status/Changes:  []  No changes reported     \"I feel great, I want to make this my last day. I have no difficulty don/doffing shoes, its good.  I have trouble sleeping but my back doesn't bother me or disrupt my sleep\"         OBJECTIVE      35 min Therapeutic Exercise:  [x]  See flow sheet   Rationale:      increase ROM, increase strength and improve coordination to improve the patients ability to perform ADLs/ bending/stooping/ lifting/prolong sitting, stding and amb/ stairs with ease        10 min Therapeutic Activity: [x]  See flow sheet  postural training  17# box lift   step ups on 6\" with 5# KB  floor <>std transfer  FOTO assessment   Rationale:    increase ROM and increase strength to improve the patients ability to perform ADLs/ bending/stooping/ lifting/prolong sitting, stding and amb/ stairs with ease         Billed With/As:   [] TE   [x] TA   [] Neuro   [] Self Care Patient Education: [x] Review HEP    [x] Progressed/Changed HEP based on:   [x] positioning   [x] body mechanics   [x] transfers   [x] heat/ice application    [x] other: Pt ed on importance and benefits of compliance with HEP, core strength/stability and proper posture; pt verbalized understanding  See updated HEP in chart     Other Objective/Functional Measures:  VCs + demo to perform proper technique for TE  FOTO=94  (I) with floor <>std transfer without p!  (I) with lifting 17# box  demos seated piriformis str without difficulty      Post Treatment Pain Level (on 0 to 10) scale:   0  / 10     ASSESSMENT  Assessment/Changes in Function:   See D/C note     []  See Progress Note/Recertification   Patient will continue to benefit from skilled PT services to modify and progress therapeutic interventions, address functional mobility deficits, address ROM deficits, address strength deficits, analyze and address soft tissue restrictions, analyze and cue movement patterns, analyze and modify body mechanics/ergonomics, assess and modify postural abnormalities and instruct in home and community integration to attain remaining goals. Progress toward goals / Updated goals: · Short Term Goals: To be accomplished in  2-3  weeks:  1. Patient to be adherent to HEP to facilitate pain control with ADL's. MET  2. Patient to centralize BLE sx to level of L/S to demonstrate effectiveness of directional preference exercises and decrease risk of LE dysfunction . MET  3. Patient to report no sleep disturbance secondary to LBP and radiculopathy. 4. Patient to don/doff shoes with minimal difficulty. MET  · Long Term Goals: To be accomplished in  4-6  weeks:  1. Patient to be Safe and Independent with HEP to self-manage/prevent symptoms after DC. MET  2. Patient to demonstrate full L/S AROM in all planes without peripheralization to indicate return to function. MET  3. Patient to increase FS FOTO score to > 51 to indicate increased functional independence. MET-94  4. Patient to demonstrate safe floor <-->stand transfers without exacerbation of LBP to facilitate work activity tolerance. MET     PLAN  [x]  Upgrade activities as tolerated yes Continue plan of care   []  Discharge due to :    []  Other:      Therapist: Karolina Lion PTA    Date: 3/11/2019 Time: 5:12 PM     No future appointments.

## 2019-03-13 ENCOUNTER — APPOINTMENT (OUTPATIENT)
Dept: PHYSICAL THERAPY | Age: 44
End: 2019-03-13
Payer: COMMERCIAL

## 2019-04-17 NOTE — PROGRESS NOTES
[address] PHYSICAL THERAPY  [address] [place] [address] [employer], [address] [employer] [address] - Phone: [phone]  Fax: [phone] SUMMARY  Patient Name: Javier Koroma : 1975   Treatment/Medical Diagnosis: Low back pain [M54.5]   Referral Source: Tonya Short.,*     Date of Initial Visit: 19 Attended Visits: 10 Missed Visits: 0     SUMMARY OF TREATMENT  Patient's POC has consisted of therex, therapeutic activities, repeated/prolong L/s movement (MDT), manual therapy prn, modalities prn, pt. education, and a comprehensive HEP. Treatment strategies used to address functional mobility deficits, ROM deficits, strength deficits, analyze and address soft tissue restrictions, analyze and cue movement patterns, analyze and modify body mechanics/ergonomics, assess and modify postural abnormalities and instruct in home and community integration. CURRENT STATUS  Pt has made excellent progress as evidence by achieving all LTGs. Pt reports compliance with HEP with no concerns or  questions at this time. Pt reports (I) with managing and abolishing sxs with repeated/prolong L/s movement (MDT). Pt reports minimal to no pain after 8 hour work day. Pt (I) and pain free with sit<>std  transfer, 17# box lift from floor <>chest<>pivot<>mat. Pt is also pain free with floor<>std transfer, previously unable without difficulty. Pt able to negotiate stairs with 5# with reciprocal pattern safely with 1 handrail support. Pt increased FOTO from 30 to 94;  indicating improved functional mobility  Goal/Measure of Progress Goal Met? 1. Patient to be Safe and Independent with HEP to self-manage/prevent symptoms after DC. Status at last Eval: compliant with HEP Current Status: Pt reports compliance with HEP with no concerns or  questions at this time yes   2.    Patient to demonstrate full L/S AROM in all planes without peripheralization to indicate return to function   Status at last Eval: pain with AROM Current Status: Pain free for AROM yes   3. Patient to increase FS FOTO score to > 51 to indicate increased functional independence   Status at last Eval: 30 Current Status: 94 yes   4. Patient to demonstrate safe floor <-->stand transfers without exacerbation of LBP to facilitate work activity toleranc   Status at last Eval: NT Current Status: (I) with floor<>std transfer yes     RECOMMENDATIONS  Pt has achieved all LTGs. Pt D/C with instructions to continue HEP Independently. If you have any questions/comments please contact us directly at [phone]. Thank you for allowing us to assist in the care of your patient. LPTA Signature: Karolina Lion, [place] Date: 3/11/19   Therapist Signature: Lili Kemp \"BJ\" Gilmar Boston, PANT, Cert. MDT, Cert. DN, Cert.  SMT Time: 11:36 AM

## 2019-05-13 ENCOUNTER — APPOINTMENT (OUTPATIENT)
Dept: GENERAL RADIOLOGY | Age: 44
End: 2019-05-13
Attending: EMERGENCY MEDICINE
Payer: OTHER MISCELLANEOUS

## 2019-05-13 ENCOUNTER — HOSPITAL ENCOUNTER (EMERGENCY)
Age: 44
Discharge: HOME | End: 2019-05-13
Attending: EMERGENCY MEDICINE
Payer: OTHER MISCELLANEOUS

## 2019-05-13 VITALS
BODY MASS INDEX: 38.24 KG/M2 | SYSTOLIC BLOOD PRESSURE: 112 MMHG | HEART RATE: 87 BPM | RESPIRATION RATE: 16 BRPM | TEMPERATURE: 98.5 F | DIASTOLIC BLOOD PRESSURE: 73 MMHG | WEIGHT: 224 LBS | HEIGHT: 64 IN | OXYGEN SATURATION: 97 %

## 2019-05-13 DIAGNOSIS — S90.32XA CONTUSION OF LEFT FOOT, INITIAL ENCOUNTER: ICD-10-CM

## 2019-05-13 DIAGNOSIS — S40.012A CONTUSION OF LEFT SHOULDER, INITIAL ENCOUNTER: ICD-10-CM

## 2019-05-13 DIAGNOSIS — S09.90XA INJURY OF HEAD, INITIAL ENCOUNTER: ICD-10-CM
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
## 2019-05-13 PROCEDURE — 73630 X-RAY EXAM OF FOOT: CPT

## 2019-05-13 PROCEDURE — 74011000250 HC RX REV CODE- 250: Performed by: EMERGENCY MEDICINE

## 2019-05-13 PROCEDURE — 73030 X-RAY EXAM OF SHOULDER: CPT

## 2019-05-13 PROCEDURE — 99284 EMERGENCY DEPT VISIT MOD MDM: CPT

## 2019-05-13 PROCEDURE — 74011250637 HC RX REV CODE- 250/637: Performed by: EMERGENCY MEDICINE

## 2019-05-13 RX ORDER — ACETAMINOPHEN 500 MG
1000 TABLET ORAL
Status: COMPLETED | OUTPATIENT
Start: 2019-05-13 | End: 2019-05-13

## 2019-05-13 RX ORDER — LIDOCAINE 50 MG/G
PATCH TOPICAL
Qty: 5 EACH | Refills: 0 | Status: SHIPPED | OUTPATIENT
Start: 2019-05-13 | End: 2020-10-19

## 2019-05-13 RX ORDER — IBUPROFEN 600 MG/1
600 TABLET ORAL
Qty: 20 TAB | Refills: 0 | Status: SHIPPED | OUTPATIENT
Start: 2019-05-13 | End: 2020-10-19

## 2019-05-13 RX ORDER — LIDOCAINE 4 G/100G
1 PATCH TOPICAL EVERY 24 HOURS
Status: DISCONTINUED | OUTPATIENT
Start: 2019-05-13 | End: 2019-05-13 | Stop reason: HOSPADM

## 2019-05-13 RX ORDER — ACETAMINOPHEN 325 MG/1
650 TABLET ORAL
Qty: 20 TAB | Refills: 0 | Status: SHIPPED | OUTPATIENT
Start: 2019-05-13 | End: 2020-10-19

## 2019-05-13 RX ORDER — CYCLOBENZAPRINE HCL 5 MG
5 TABLET ORAL
Qty: 12 TAB | Refills: 0 | Status: SHIPPED | OUTPATIENT
Start: 2019-05-13 | End: 2020-10-19

## 2019-05-13 RX ORDER — IBUPROFEN 600 MG/1
600 TABLET ORAL
Status: COMPLETED | OUTPATIENT
Start: 2019-05-13 | End: 2019-05-13

## 2019-05-13 RX ADMIN — ACETAMINOPHEN 1000 MG: 500 TABLET, FILM COATED ORAL at 06:58

## 2019-05-13 RX ADMIN — IBUPROFEN 600 MG: 600 TABLET ORAL at 06:58

## 2019-05-13 NOTE — ED NOTES
Patient stated understanding of discharge instructions. Patient received four prescription(s) Patient told not to drive with medication. Patient was ambulatory upon discharge. Patient was in stable condition. Patient was accompanies with family member. Patient armband removed and shredded

## 2019-05-13 NOTE — ED TRIAGE NOTES
Pt ambulatory into ED for c/o mechanical fall that occurred at 0343am this morning. Pt works at [employer], states she was up on a ladder (approx 3ft high), tipped sideways, and hit her left shoulder, the left side of her head, and landed on her left foot. At this time pt is c/o headache, left shoulder pain (full ROM PMSx4 in LUE), and left foot pain. Pt denies LOC, denies N/V. States she does feel slightly dizzy /w some blurry vision.

## 2019-05-13 NOTE — ED PROVIDER NOTES
EMERGENCY DEPARTMENT HISTORY AND PHYSICAL EXAM 
 
 
Date: 5/13/2019 Patient Name: Armando Gonzalez History of Presenting Illness Chief Complaint Patient presents with  Shoulder Pain  
  left  Headache  Foot Pain  
  left  Dizziness History Provided By: Patient HPI/Chief Complaint: (Context):who presents with fall mechanical from a ladder approximately 3 hours ago No LOC also left side of the head there is no radiation of the pain is throbbing pain no neck pain no midline tenderness no arm or leg weakness paresthesias bowel bladder dysfunction no confusion at this point patient has initially had some blurry vision but is all resolved now no floaters no vision loss Patient has left shoulder and left foot pain this is constant mild worse with movement sharp pain no radiation of symptoms no numbness or weakness in arm or foot. Patient is good range of motion in both with some pain that is limiting her. Patient is not on any blood thinners. Patient's triage note is reviewed patient with left shoulder left foot pain headache and dizziness when the event occurred. Patient's vitals are stable in the emergency department no hypoxia on room air Patient's triage note with 3:43 AM fall at [facility] mechanical fall no LOC Patient's allergies to Bactrim Patient's home medication including Atarax Synthroid singular Paxil are reviewed Patient's past medical history of asthma thyroid disease fibroids hypertension and hepatitis are reviewed Patient surgical history of cholecystectomy hysterectomy breast lumpectomy are reviewed Patient social history of no smoking no alcohol use PCP: WOJCIECH Gee Current Outpatient Medications Medication Sig Dispense Refill  ibuprofen (MOTRIN) 600 mg tablet Take 1 Tab by mouth every six (6) hours as needed for Pain.  20 Tab 0  
 acetaminophen (TYLENOL) 325 mg tablet Take 2 Tabs by mouth every four (4) hours as needed for Pain. 20 Tab 0  cyclobenzaprine (FLEXERIL) 5 mg tablet Take 1 Tab by mouth three (3) times daily as needed for Muscle Spasm(s). 12 Tab 0  
 lidocaine (LIDODERM) 5 % Apply patch to the affected area for 12 hours a day and remove for 12 hours a day. 5 Each 0  
 PARoxetine (PAXIL) 20 mg tablet Take  by mouth daily.  montelukast sodium (SINGULAIR PO) Take  by mouth.  hydrOXYzine HCl (ATARAX) 25 mg tablet  triamcinolone acetonide (KENALOG) 0.1 % topical cream     
 B.infantis-B.ani-B.long-B.bifi (PROBIOTIC 4X) 10-15 mg TbEC Take  by mouth two (2) times a day.  psyllium (METAMUCIL) powd Take  by mouth two (2) times a day.  amLODIPine (NORVASC) 5 mg tablet Take 5 mg by mouth daily. Indications: HYPERTENSION    
 ALBUTEROL IN Take  by inhalation as needed.  levothyroxine (SYNTHROID) 100 mcg tablet Take  by mouth daily (before breakfast). Past History Past Medical History: 
Past Medical History:  
Diagnosis Date  Abscess   
 skin abcesses  Asthma  Fibroids 8/27/2013  Gastrointestinal disorder   
 acid reflux  Hepatitis Hep A from food  Hypertension 10/2013  Thyroid disorder Past Surgical History: 
Past Surgical History:  
Procedure Laterality Date  HX BREAST LUMPECTOMY Left 1/23/2018 Excisional left breast biopsy performed by Jourdan Ramon MD at [address] [place] [address] HX CHOLECYSTECTOMY  02/23/2010  
 with intraoperative cholangiogram  
 HX HYSTERECTOMY Family History: 
Family History Problem Relation Age of Onset  Hypertension Father  Diabetes Father  Diabetes Paternal Uncle Social History: 
Social History Tobacco Use  Smoking status: Never Smoker  Smokeless tobacco: Never Used Substance Use Topics  Alcohol use: No  
 Drug use: No  
 
 
Allergies: Allergies Allergen Reactions  Bactrim [Sulfamethoprim Ds] Nausea and Vomiting Review of Systems Review of Systems Constitutional: Negative for activity change, fatigue and fever. HENT: Negative for congestion and rhinorrhea. Eyes: Negative for visual disturbance. Respiratory: Negative for shortness of breath. Cardiovascular: Negative for chest pain and palpitations. Gastrointestinal: Negative for abdominal pain, diarrhea, nausea and vomiting. Genitourinary: Negative for dysuria and hematuria. Musculoskeletal: Positive for arthralgias and myalgias. Negative for back pain. Skin: Negative for rash. Neurological: Negative for dizziness, weakness and light-headedness. Psychiatric/Behavioral: Negative for agitation. All other systems reviewed and are negative. Physical Exam  
 
Physical Exam  
Constitutional: She appears well-developed and well-nourished. No distress. HENT:  
Head: Normocephalic and atraumatic. Right Ear: External ear normal.  
Left Ear: External ear normal.  
Nose: Nose normal.  
Mouth/Throat: Oropharynx is clear and moist.  
Eyes: Pupils are equal, round, and reactive to light. Conjunctivae and EOM are normal. No scleral icterus. Neck: Normal range of motion. Neck supple. No JVD present. No tracheal deviation present. No thyromegaly present. Cardiovascular: Normal rate and regular rhythm. Exam reveals no friction rub. No murmur heard. Pulmonary/Chest: Effort normal and breath sounds normal. No stridor. She exhibits no tenderness. Abdominal: Soft. Bowel sounds are normal. She exhibits no distension. There is no tenderness. There is no rebound and no guarding. Musculoskeletal: Normal range of motion. She exhibits no edema or tenderness. Left shoulder tenderness on palpation, no deformity no numbness no weakness Left foot without any acute deformity although there is tenderness present in the foot no ankle tenderness no numbness no weakness. Dorsalis pedis pulse present normal. 
Normal capillary refill in hand and foot digits Lymphadenopathy: She has no cervical adenopathy. Neurological: She is alert. No cranial nerve deficit. Coordination normal.  
Skin: Skin is warm and dry. Psychiatric: She has a normal mood and affect. Her behavior is normal. Judgment and thought content normal.  
Nursing note and vitals reviewed. Medical Decision Making I am the first provider for this patient. I reviewed the vital signs, available nursing notes, past medical history, past surgical history, family history and social history. Provider Notes (Medical Decision Making): 
Patient presents emergency department acute fall. X-rays rule out fracture No acute deformity Symptom medic treatment relief Patient has no cervical spine tenderness Nexus followed and patient cleared medically without any further imaging. Vital Signs-Reviewed the patient's vital signs. Pulse Oximetry Analysis -100%, room air, normal 
 
 
Vitals:  
 05/13/19 0443 05/13/19 0700 05/13/19 0730 05/13/19 0800 BP: 132/83 105/69 105/71 112/73 Pulse: 86 87 Resp: 16 16 Temp: 98.5 °F (36.9 °C) SpO2: 100% 98% 98% 97% Weight: 101.6 kg (224 lb) Height: 5' 4\" (1.626 m) Records Reviewed: Nursing Notes ED Course:  
  
 
 
 
Diagnostic Study Results Labs - No results found for this or any previous visit (from the past 12 hour(s)). Radiologic Studies -  
XR SHOULDER LT AP/LAT MIN 2 V Final Result IMPRESSION:  
  
  
1. No acute fracture or dislocation. Degenerative changes. XR FOOT LT MIN 3 V Final Result IMPRESSION:  
  
  
1. No acute bony abnormality. Degenerative changes. Calcaneal enthesophyte. CT Results  (Last 48 hours) None CXR Results  (Last 48 hours) None Discharge Clinical Impression: 1. Fall, initial encounter 2. Contusion of left shoulder, initial encounter 3. Contusion of left foot, initial encounter 4. Injury of head, initial encounter Disposition: 
Home It should be noted that I will be the provider of record for this patient Javier Figueroa MD, Earney Gottron Follow-up Information Follow up With Specialties Details Why Contact Info WOJCIECH De La Rosa Physician Assistant Call in 1 day Follow Up From Emergency Department [address] [id number] 
[phone] [facility] EMERGENCY DEPT Emergency Medicine  If symptoms worsen [address] 
[phone] Discharge Medication List as of 5/13/2019  8:06 AM  
  
START taking these medications Details  
cyclobenzaprine (FLEXERIL) 5 mg tablet Take 1 Tab by mouth three (3) times daily as needed for Muscle Spasm(s). , Print, Disp-12 Tab, R-0  
  
lidocaine (LIDODERM) 5 % Apply patch to the affected area for 12 hours a day and remove for 12 hours a day., Print, Disp-5 Each, R-0  
  
  
CONTINUE these medications which have CHANGED Details  
ibuprofen (MOTRIN) 600 mg tablet Take 1 Tab by mouth every six (6) hours as needed for Pain., Print, Disp-20 Tab, R-0  
  
acetaminophen (TYLENOL) 325 mg tablet Take 2 Tabs by mouth every four (4) hours as needed for Pain., Print, Disp-20 Tab, R-0  
  
  
CONTINUE these medications which have NOT CHANGED Details PARoxetine (PAXIL) 20 mg tablet Take  by mouth daily. , Historical Med  
  
montelukast sodium (SINGULAIR PO) Take  by mouth., Historical Med  
  
hydrOXYzine HCl (ATARAX) 25 mg tablet Historical Med  
  
triamcinolone acetonide (KENALOG) 0.1 % topical cream Historical Med  
  
B.infantis-B.ani-B.long-B.bifi (PROBIOTIC 4X) 10-15 mg TbEC Take  by mouth two (2) times a day., Historical Med  
  
psyllium (METAMUCIL) powd Take  by mouth two (2) times a day., Historical Med  
  
amLODIPine (NORVASC) 5 mg tablet Take 5 mg by mouth daily. Indications: HYPERTENSION, Historical Med ALBUTEROL IN Take  by inhalation as needed. Historical Med  
  
levothyroxine (SYNTHROID) 100 mcg tablet Take  by mouth daily (before breakfast). Historical Med

## 2019-05-13 NOTE — LETTER
[facility] EMERGENCY DEPT 
[address] [facility] [address] [id number] 
[phone] Work/School Note Date: 5/13/2019 To Whom It May concern: 
 
Grayce Coma was seen and treated today in the emergency room by the following provider(s): 
Attending Provider: Jose Gillespie MD.   
 
Grayce Coma may return to work on 05/15/2019. Sincerely, Jennifer Nix MD

## 2019-05-28 NOTE — DISCHARGE INSTRUCTIONS
Patient Education     Contusion: Care Instructions  Your Care Instructions  Contusion is the medical term for a bruise. It is the result of a direct blow or an impact, such as a fall. Contusions are common sports injuries. Most people think of a bruise as a black-and-blue spot. This happens when small blood vessels get torn and leak blood under the skin. But bones, muscles, and organs can also get bruised. This may damage deep tissues but not cause a bruise you can see. The doctor will do a physical exam to find the location of your contusion. You may also have tests to make sure you do not have a more serious injury, such as a broken bone or nerve damage. These may include X-rays or other imaging tests like a CT scan or MRI. Deep-tissue contusions may cause pain and swelling. But if there is no serious damage, they will often get better in a few weeks with home treatment. The doctor has checked you carefully, but problems can develop later. If you notice any problems or new symptoms, get medical treatment right away. Follow-up care is a key part of your treatment and safety. Be sure to make and go to all appointments, and call your doctor if you are having problems. It's also a good idea to know your test results and keep a list of the medicines you take. How can you care for yourself at home? · Put ice or a cold pack on the sore area for 10 to 20 minutes at a time to stop swelling. Put a thin cloth between the ice pack and your skin. · Be safe with medicines. Read and follow all instructions on the label. ¨ If the doctor gave you a prescription medicine for pain, take it as prescribed. ¨ If you are not taking a prescription pain medicine, ask your doctor if you can take an over-the-counter medicine. · If you can, prop up the sore area on pillows as much as possible for the next few days. Try to keep the sore area above the level of your heart. When should you call for help?   Call your doctor now or Pt up to room 302 from PACU, handoff report received from Alesha GERARDO.  Pt resting comfortably in bed.   in room with pt.  Orientated to room, nurse call, phone, TV, and room service.   seek immediate medical care if:  · Your pain gets worse. · You have new or worse swelling. · You have tingling, weakness, or numbness in the area near the contusion. · The area near the contusion is cold or pale. Watch closely for changes in your health, and be sure to contact your doctor if:  · You do not get better as expected. Where can you learn more? Go to [employer]  Enter [id number] in the search box to learn more about \"Contusion: Care Instructions. \"   © 1478-6684 [employer]. Care instructions adapted under license by Emilie Cee (which disclaims liability or warranty for this information). This care instruction is for use with your licensed healthcare professional. If you have questions about a medical condition or this instruction, always ask your healthcare professional. [address] any warranty or liability for your use of this information. Content Version: 20.6.911186; Current as of: May 22, 2015           Patient Education        Contusion: Care Instructions  Your Care Instructions    Contusion is the medical term for a bruise. It is the result of a direct blow or an impact, such as a fall. Contusions are common sports injuries. Most people think of a bruise as a black-and-blue spot. This happens when small blood vessels get torn and leak blood under the skin. But bones, muscles, and organs can also get bruised. This may damage deep tissues but not cause a bruise you can see. The doctor will do a physical exam to find the location of your contusion. You may also have tests to make sure you do not have a more serious injury, such as a broken bone or nerve damage. These may include X-rays or other imaging tests like a CT scan or MRI. Deep-tissue contusions may cause pain and swelling. But if there is no serious damage, they will often get better in a few weeks with home treatment.   The doctor has checked you carefully, but problems can develop later. If you notice any problems or new symptoms, get medical treatment right away. Follow-up care is a key part of your treatment and safety. Be sure to make and go to all appointments, and call your doctor if you are having problems. It's also a good idea to know your test results and keep a list of the medicines you take. How can you care for yourself at home? · Put ice or a cold pack on the sore area for 10 to 20 minutes at a time to stop swelling. Put a thin cloth between the ice pack and your skin. · Be safe with medicines. Read and follow all instructions on the label. ? If the doctor gave you a prescription medicine for pain, take it as prescribed. ? If you are not taking a prescription pain medicine, ask your doctor if you can take an over-the-counter medicine. · If you can, prop up the sore area on pillows as much as possible for the next few days. Try to keep the sore area above the level of your heart. When should you call for help? Call your doctor now or seek immediate medical care if:    · Your pain gets worse.     · You have new or worse swelling.     · You have tingling, weakness, or numbness in the area near the contusion.     · The area near the contusion is cold or pale.    Watch closely for changes in your health, and be sure to contact your doctor if:    · You do not get better as expected. Where can you learn more? Go to http://roz-gonzalo.info/. Enter Q559 in the search box to learn more about \"Contusion: Care Instructions. \"  Current as of: September 23, 2018  Content Version: 11.9  © 5586-8736 [employer]. Care instructions adapted under license by [employer] (which disclaims liability or warranty for this information). If you have questions about a medical condition or this instruction, always ask your healthcare professional. [address] any warranty or liability for your use of this information. Patient Education        Preventing Falls: Care Instructions  Your Care Instructions    Getting around your home safely can be a challenge if you have injuries or health problems that make it easy for you to fall. Loose rugs and furniture in walkways are among the dangers for many older people who have problems walking or who have poor eyesight. People who have conditions such as arthritis, osteoporosis, or dementia also have to be careful not to fall. You can make your home safer with a few simple measures. Follow-up care is a key part of your treatment and safety. Be sure to make and go to all appointments, and call your doctor if you are having problems. It's also a good idea to know your test results and keep a list of the medicines you take. How can you care for yourself at home? Taking care of yourself  · You may get dizzy if you do not drink enough water. To prevent dehydration, drink plenty of fluids, enough so that your urine is light yellow or clear like water. Choose water and other caffeine-free clear liquids. If you have kidney, heart, or liver disease and have to limit fluids, talk with your doctor before you increase the amount of fluids you drink. · Exercise regularly to improve your strength, muscle tone, and balance. Walk if you can. Swimming may be a good choice if you cannot walk easily. · Have your vision and hearing checked each year or any time you notice a change. If you have trouble seeing and hearing, you might not be able to avoid objects and could lose your balance. · Know the side effects of the medicines you take. Ask your doctor or pharmacist whether the medicines you take can affect your balance. Sleeping pills or sedatives can affect your balance. · Limit the amount of alcohol you drink. Alcohol can impair your balance and other senses. · Ask your doctor whether calluses or corns on your feet need to be removed.  If you wear loose-fitting shoes because of calluses or corns, you can lose your balance and fall. · Talk to your doctor if you have numbness in your feet. Preventing falls at home  · Remove raised doorway thresholds, throw rugs, and clutter. Repair loose carpet or raised areas in the floor. · Move furniture and electrical cords to keep them out of walking paths. · Use nonskid floor wax, and wipe up spills right away, especially on ceramic tile floors. · If you use a walker or cane, put rubber tips on it. If you use crutches, clean the bottoms of them regularly with an abrasive pad, such as steel wool. · Keep your house well lit, especially [place] [employer], and outside walkways. Use night-lights in areas such as hallways and bathrooms. Add extra light switches or use remote switches (such as switches that go on or off when you clap your hands) to make it easier to turn lights on if you have to get up during the night. · Install sturdy handrails on stairways. · Move items in your cabinets so that the things you use a lot are on the lower shelves (about waist level). · Keep a cordless phone and a flashlight with new batteries by your bed. If possible, put a phone in each of the main rooms of your house, or carry a cell phone in case you fall and cannot reach a phone. Or, you can wear a device around your neck or wrist. You push a button that sends a signal for help. · Wear low-heeled shoes that fit well and give your feet good support. Use footwear with nonskid soles. Check the heels and soles of your shoes for wear. Repair or replace worn heels or soles. · Do not wear socks without shoes on wood floors. · Walk on the grass when the sidewalks are slippery. If you live in an area that gets snow and ice in the winter, sprinkle salt on slippery steps and sidewalks. Preventing falls in the bath  · Install grab bars and nonskid mats inside and outside your shower or tub and near the toilet and sinks. · Use shower chairs and bath benches.   · Use a hand-held shower head that will allow you to sit while showering. · Get into a tub or shower by putting the weaker leg in first. Get out of a tub or shower with your strong side first.  · Repair loose toilet seats and consider installing a raised toilet seat to make getting on and off the toilet easier. · Keep your bathroom door unlocked while you are in the shower. Where can you learn more? Go to http://roz-gonzalo.info/. Enter [id number] [id number] in the search box to learn more about \"Preventing Falls: Care Instructions. \"  Current as of: March 16, 2018  Content Version: 11.8  © 4696-5876 [employer]. Care instructions adapted under license by [employer] (which disclaims liability or warranty for this information). If you have questions about a medical condition or this instruction, always ask your healthcare professional. [place] [address] any warranty or liability for your use of this information. Patient Education        Learning About a Closed Head Injury  What is a closed head injury? A closed head injury happens when your head gets hit hard. The strong force of the blow causes your brain to shake in your skull. This movement can cause the brain to bruise, swell, or tear. Sometimes nerves or blood vessels also get damaged. This can cause bleeding in or around the brain. A concussion is a type of closed head injury. What are the symptoms? If you have a mild concussion, you may have a mild headache or feel \"not quite right. \" These symptoms are common. They usually go away over a few days to 4 weeks. But sometimes after a concussion, you feel like you can't function as well as before the injury. And you have new symptoms. This is called postconcussive syndrome. You may:  · Find it harder to solve problems, think, concentrate, or remember. · Have headaches.   · Have changes in your sleep patterns, such as not being able to sleep or sleeping all the time.  · Have changes in your personality. · Not be interested in your usual activities. · Feel angry or anxious without a clear reason. · Lose your sense of taste or smell. · Be dizzy, lightheaded, or unsteady. It may be hard to stand or walk. How is a closed head injury treated? Any person who may have a concussion needs to see a doctor. Some people have to stay in the hospital to be watched. Others can go home safely. If you go home, follow your doctor's instructions. He or she will tell you if you need someone to watch you closely for the next 24 hours or longer. Rest is the best treatment. Get plenty of sleep at night. And try to rest during the day. · Avoid activities that are physically or mentally demanding. These include housework, exercise, and schoolwork. And don't play video games, send text messages, or use the computer. You may need to change your school or work schedule to be able to avoid these activities. · Ask your doctor when it's okay to drive, ride a bike, or operate machinery. · Take an over-the-counter pain medicine, such as acetaminophen (Tylenol), ibuprofen (Advil, Motrin), or naproxen (Aleve). Be safe with medicines. Read and follow all instructions on the label. · Check with your doctor before you use any other medicines for pain. · Do not drink alcohol or use illegal drugs. They can slow recovery. They can also increase your risk of getting a second head injury. Follow-up care is a key part of your treatment and safety. Be sure to make and go to all appointments, and call your doctor if you are having problems. It's also a good idea to know your test results and keep a list of the medicines you take. Where can you learn more? Go to http://roz-gonzalo.info/. Enter E235 in the search box to learn more about \"Learning About a Closed Head Injury. \"  Current as of: Mabel 3, 2018  Content Version: 11.9  © 9869-2437 [employer].  Care instructions adapted under license by [employer] (which disclaims liability or warranty for this information). If you have questions about a medical condition or this instruction, always ask your healthcare professional. [address] any warranty or liability for your use of this information.

## 2020-02-27 ENCOUNTER — HOSPITAL ENCOUNTER (OUTPATIENT)
Dept: VASCULAR SURGERY | Age: 45
Discharge: HOME | End: 2020-02-27
Attending: FAMILY MEDICINE
Payer: COMMERCIAL

## 2020-02-27 DIAGNOSIS — R60.9 EDEMA: ICD-10-CM

## 2020-02-27 PROCEDURE — 93970 EXTREMITY STUDY: CPT

## 2020-03-02 ENCOUNTER — HOSPITAL ENCOUNTER (OUTPATIENT)
Dept: NUCLEAR MEDICINE | Age: 45
Discharge: HOME | End: 2020-03-02
Attending: FAMILY MEDICINE
Payer: COMMERCIAL

## 2020-03-02 ENCOUNTER — HOSPITAL ENCOUNTER (OUTPATIENT)
Dept: NON INVASIVE DIAGNOSTICS | Age: 45
Discharge: HOME | End: 2020-03-02
Attending: FAMILY MEDICINE
Payer: COMMERCIAL

## 2020-03-02 VITALS
HEIGHT: 64 IN | DIASTOLIC BLOOD PRESSURE: 80 MMHG | WEIGHT: 235.2 LBS | BODY MASS INDEX: 40.15 KG/M2 | SYSTOLIC BLOOD PRESSURE: 140 MMHG

## 2020-03-02 VITALS
WEIGHT: 224 LBS | BODY MASS INDEX: 38.24 KG/M2 | HEIGHT: 64 IN | DIASTOLIC BLOOD PRESSURE: 83 MMHG | SYSTOLIC BLOOD PRESSURE: 132 MMHG

## 2020-03-02 DIAGNOSIS — R06.02 SHORTNESS OF BREATH: ICD-10-CM

## 2020-03-02 DIAGNOSIS — R60.9 EDEMA: ICD-10-CM

## 2020-03-02 LAB
ECHO AO ASC DIAM: 3.15 CM
ECHO AO ROOT DIAM: 2.95 CM
ECHO LA MAJOR AXIS: 3.87 CM
ECHO LA TO AORTIC ROOT RATIO: 1.31
ECHO LV EDV A2C: 89.1 ML
ECHO LV EDV A4C: 130.6 ML
ECHO LV EDV BP: 108.1 ML (ref 56–104)
ECHO LV EDV INDEX A4C: 62.3 ML/M2
ECHO LV EDV INDEX BP: 51.6 ML/M2
ECHO LV EDV NDEX A2C: 42.5 ML/M2
ECHO LV EDV TEICHHOLZ: 0.53 ML
ECHO LV EJECTION FRACTION A2C: 67 %
ECHO LV EJECTION FRACTION A4C: 58 %
ECHO LV EJECTION FRACTION BIPLANE: 62.4 % (ref 55–100)
ECHO LV ESV A2C: 29.2 ML
ECHO LV ESV A4C: 55.2 ML
ECHO LV ESV BP: 40.6 ML (ref 19–49)
ECHO LV ESV INDEX A2C: 13.9 ML/M2
ECHO LV ESV INDEX A4C: 26.3 ML/M2
ECHO LV ESV INDEX BP: 19.4 ML/M2
ECHO LV ESV TEICHHOLZ: 0.15 ML
ECHO LV INTERNAL DIMENSION DIASTOLIC: 4.38 CM (ref 3.9–5.3)
ECHO LV INTERNAL DIMENSION SYSTOLIC: 2.58 CM
ECHO LV IVSD: 0.92 CM (ref 0.6–0.9)
ECHO LV MASS 2D: 140.6 G (ref 67–162)
ECHO LV MASS INDEX 2D: 67.1 G/M2 (ref 43–95)
ECHO LV POSTERIOR WALL DIASTOLIC: 0.85 CM (ref 0.6–0.9)
ECHO LVOT DIAM: 2.03 CM
ECHO LVOT PEAK GRADIENT: 6.6 MMHG
ECHO LVOT PEAK VELOCITY: 128.22 CM/S
ECHO LVOT SV: 93.9 ML
ECHO LVOT VTI: 29.04 CM
ECHO MV A VELOCITY: 88.68 CM/S
ECHO MV E DECELERATION TIME (DT): 249.2 MS
ECHO MV E VELOCITY: 101.78 CM/S
ECHO MV E/A RATIO: 1.15
ECHO PULMONARY ARTERY SYSTOLIC PRESSURE (PASP): 18 MMHG
ECHO RA MINOR AXIS: 3.39 CM
ECHO TV REGURGITANT MAX VELOCITY: 193.6 CM/S
ECHO TV REGURGITANT PEAK GRADIENT: 15 MMHG
LVFS 2D: 41.04 %
LVOT MG: 3.87 MMHG
LVOT MV: 0.94 CM/S
LVSV (MOD BI): 31.51 ML
LVSV (MOD SINGLE 4C): 35.24 ML
LVSV (MOD SINGLE): 27.98 ML
LVSV (TEICH): 29.25 ML
MV DEC SLOPE: 4.08
STRESS BASELINE HR: 64 BPM
STRESS ESTIMATED WORKLOAD: 1.4 METS
STRESS EXERCISE DUR MIN: NORMAL
STRESS PEAK DIAS BP: 80 MMHG
STRESS PEAK SYS BP: 144 MMHG
STRESS PERCENT HR ACHIEVED: 65 %
STRESS POST PEAK HR: 114 BPM
STRESS RATE PRESSURE PRODUCT: NORMAL BPM*MMHG
STRESS ST DEPRESSION: 0 MM
STRESS ST ELEVATION: 0 MM
STRESS TARGET HR: 176 BPM

## 2020-03-02 PROCEDURE — 93017 CV STRESS TEST TRACING ONLY: CPT

## 2020-03-02 PROCEDURE — 93306 TTE W/DOPPLER COMPLETE: CPT

## 2020-03-02 PROCEDURE — 74011250636 HC RX REV CODE- 250/636: Performed by: FAMILY MEDICINE

## 2020-03-02 PROCEDURE — A9500 TC99M SESTAMIBI: HCPCS

## 2020-03-02 RX ADMIN — REGADENOSON 0.4 MG: 0.08 INJECTION, SOLUTION INTRAVENOUS at 09:52

## 2020-06-24 ENCOUNTER — HOSPITAL ENCOUNTER (OUTPATIENT)
Dept: MRI IMAGING | Age: 45
Discharge: HOME | End: 2020-06-24
Attending: PODIATRIST
Payer: COMMERCIAL

## 2020-06-24 DIAGNOSIS — M79.671 RIGHT FOOT PAIN: ICD-10-CM

## 2020-06-24 PROCEDURE — 73718 MRI LOWER EXTREMITY W/O DYE: CPT

## 2020-06-30 ENCOUNTER — HOSPITAL ENCOUNTER (OUTPATIENT)
Dept: GENERAL RADIOLOGY | Age: 45
Discharge: HOME | End: 2020-06-30
Payer: COMMERCIAL

## 2020-06-30 DIAGNOSIS — Z01.810 PRE-OPERATIVE CARDIOVASCULAR EXAMINATION: ICD-10-CM

## 2020-06-30 PROCEDURE — 71046 X-RAY EXAM CHEST 2 VIEWS: CPT

## 2020-10-19 ENCOUNTER — HOSPITAL ENCOUNTER (EMERGENCY)
Age: 45
Discharge: HOME | End: 2020-10-19
Attending: EMERGENCY MEDICINE
Payer: COMMERCIAL

## 2020-10-19 ENCOUNTER — APPOINTMENT (OUTPATIENT)
Dept: GENERAL RADIOLOGY | Age: 45
End: 2020-10-19
Attending: EMERGENCY MEDICINE
Payer: COMMERCIAL

## 2020-10-19 VITALS
HEIGHT: 64 IN | HEART RATE: 74 BPM | SYSTOLIC BLOOD PRESSURE: 137 MMHG | DIASTOLIC BLOOD PRESSURE: 81 MMHG | WEIGHT: 233 LBS | TEMPERATURE: 97.5 F | OXYGEN SATURATION: 100 % | BODY MASS INDEX: 39.78 KG/M2 | RESPIRATION RATE: 20 BRPM

## 2020-10-19 DIAGNOSIS — J45.21 MILD INTERMITTENT ASTHMA WITH ACUTE EXACERBATION: Primary | ICD-10-CM

## 2020-10-19 PROCEDURE — 87635 SARS-COV-2 COVID-19 AMP PRB: CPT

## 2020-10-19 PROCEDURE — 94640 AIRWAY INHALATION TREATMENT: CPT

## 2020-10-19 PROCEDURE — 74011000250 HC RX REV CODE- 250: Performed by: EMERGENCY MEDICINE

## 2020-10-19 PROCEDURE — 74011636637 HC RX REV CODE- 636/637: Performed by: EMERGENCY MEDICINE

## 2020-10-19 PROCEDURE — 71045 X-RAY EXAM CHEST 1 VIEW: CPT

## 2020-10-19 PROCEDURE — 99283 EMERGENCY DEPT VISIT LOW MDM: CPT

## 2020-10-19 RX ORDER — IPRATROPIUM BROMIDE AND ALBUTEROL SULFATE 2.5; .5 MG/3ML; MG/3ML
3 SOLUTION RESPIRATORY (INHALATION)
Status: COMPLETED | OUTPATIENT
Start: 2020-10-19 | End: 2020-10-19

## 2020-10-19 RX ORDER — PREDNISONE 20 MG/1
60 TABLET ORAL
Status: COMPLETED | OUTPATIENT
Start: 2020-10-19 | End: 2020-10-19

## 2020-10-19 RX ORDER — VALACYCLOVIR HYDROCHLORIDE 1 G/1
500 TABLET, FILM COATED ORAL DAILY
COMMUNITY

## 2020-10-19 RX ORDER — PREDNISONE 20 MG/1
60 TABLET ORAL DAILY
Qty: 15 TAB | Refills: 0 | Status: SHIPPED | OUTPATIENT
Start: 2020-10-19 | End: 2020-10-24

## 2020-10-19 RX ORDER — IPRATROPIUM BROMIDE AND ALBUTEROL SULFATE 2.5; .5 MG/3ML; MG/3ML
SOLUTION RESPIRATORY (INHALATION)
Status: DISCONTINUED
Start: 2020-10-19 | End: 2020-10-19 | Stop reason: HOSPADM

## 2020-10-19 RX ADMIN — IPRATROPIUM BROMIDE AND ALBUTEROL SULFATE 3 ML: .5; 3 SOLUTION RESPIRATORY (INHALATION) at 04:53

## 2020-10-19 RX ADMIN — IPRATROPIUM BROMIDE AND ALBUTEROL SULFATE 3 ML: .5; 3 SOLUTION RESPIRATORY (INHALATION) at 05:37

## 2020-10-19 RX ADMIN — PREDNISONE 60 MG: 20 TABLET ORAL at 04:53

## 2020-10-19 NOTE — DISCHARGE INSTRUCTIONS

## 2020-10-19 NOTE — ED TRIAGE NOTES
Patient comes in with cough and wheezing. Asked patient how long her asthma had been acting up, she said that it has \"been acting up. \" Patient states that she has been trying to use her inhaler with no relief.

## 2020-10-19 NOTE — ED PROVIDER NOTES
39 yo CF with PMHx asthma presents with a couple days cough, wheezing, shortness of breath. No fevers, no vomiting. No known sick contacts. Pt has used her nebulizer without full relief. Pt was going to work and decided to come to ED.            Past Medical History:   Diagnosis Date    Abscess     skin abcesses    Asthma     Fibroids 8/27/2013    Gastrointestinal disorder     acid reflux    Hepatitis      Hep A from food    Hypertension 10/2013    Thyroid disorder        Past Surgical History:   Procedure Laterality Date    HX BREAST LUMPECTOMY Left 1/23/2018    Excisional left breast biopsy performed by Selina Carbajal MD at [address] [place] [address]  02/23/2010    with intraoperative cholangiogram    HX HYSTERECTOMY           Family History:   Problem Relation Age of Onset    Hypertension Father     Diabetes Father     Diabetes Paternal Uncle        Social History     Socioeconomic History    Marital status: SINGLE     Spouse name: Not on file    Number of children: Not on file    Years of education: Not on file    Highest education level: Not on file   Occupational History    Not on file   Social Needs    Financial resource strain: Not on file    Food insecurity     Worry: Not on file     Inability: Not on file    Transportation needs     Medical: Not on file     Non-medical: Not on file   Tobacco Use    Smoking status: Never Smoker    Smokeless tobacco: Never Used   Substance and Sexual Activity    Alcohol use: No    Drug use: No    Sexual activity: Yes     Partners: Male     Birth control/protection: None   Lifestyle    Physical activity     Days per week: Not on file     Minutes per session: Not on file    Stress: Not on file   Relationships    Social connections     Talks on phone: Not on file     Gets together: Not on file     Attends Rastafarian service: Not on file     Active member of club or organization: Not on file     Attends meetings of clubs or organizations: Not on file     Relationship status: Not on file    Intimate partner violence     Fear of current or ex partner: Not on file     Emotionally abused: Not on file     Physically abused: Not on file     Forced sexual activity: Not on file   Other Topics Concern    Not on file   Social History Narrative    Not on file         ALLERGIES: Bactrim [sulfamethoprim ds]    Review of Systems   Constitutional: Negative for fever. HENT: Negative for trouble swallowing. Respiratory: Positive for cough, shortness of breath and wheezing. Cardiovascular: Negative for chest pain. Gastrointestinal: Positive for nausea. Negative for abdominal pain, diarrhea and vomiting. Genitourinary: Negative for difficulty urinating. Skin: Negative for wound. Neurological: Negative for syncope. Psychiatric/Behavioral: Negative for behavioral problems. All other systems reviewed and are negative. Vitals:    10/19/20 0436   BP: 137/81   Pulse: 74   Resp: 20   Temp: 97.5 °F (36.4 °C)   SpO2: 100%   Weight: 105.7 kg (233 lb)   Height: 5' 4\" (1.626 m)            Physical Exam  Vitals signs and nursing note reviewed. Constitutional:       General: She is not in acute distress. Appearance: She is well-developed. Comments: well-appearing, nad   HENT:      Head: Normocephalic and atraumatic. Neck:      Musculoskeletal: Normal range of motion. Cardiovascular:      Rate and Rhythm: Normal rate. Pulses: Normal pulses. Pulmonary:      Effort: Pulmonary effort is normal. No respiratory distress. Breath sounds: Wheezing present. Comments: Some cough with mild wheezes  Abdominal:      Palpations: Abdomen is soft. Tenderness: There is no abdominal tenderness. Musculoskeletal: Normal range of motion. Comments: Mechanically stable   Skin:     General: Skin is warm. Neurological:      General: No focal deficit present. Mental Status: She is alert and oriented to person, place, and time. Comments: Ambulates without difficulty   Psychiatric:         Behavior: Behavior normal.          MDM  Number of Diagnoses or Management Options  Mild intermittent asthma with acute exacerbation:   Diagnosis management comments: 41 yo CF with PMhx asthma presents with a couple days cough, wheezing, shortness of breath. No fevers, no known sick contacts. Examination with some cough and mild wheezes but otherwise unremarkable. Will evaluate for acute process, treat empirically as asthma exacerbation. 5:43 AM  cxr ok to my read. Pt doing ok, mild residual wheezes. Will treat empirically as asthma exacerbation. Discussed results and poc for dc home, symptom management, follow-up, return precautions. Amount and/or Complexity of Data Reviewed  Tests in the radiology section of CPT®: ordered and reviewed  Review and summarize past medical records: yes  Independent visualization of images, tracings, or specimens: yes    Patient Progress  Patient progress: stable         Procedures      PROGRESS NOTES    4:46 AM:   Nelson Knuz MD arrives to the bedside to evaluate the patient. Answered the patient's questions regarding the treatment plan. CONSULTATIONS  None      MEDICATIONS ORDERED  Medications   albuterol-ipratropium (DUO-NEB) 2.5 mg-0.5 mg/3 ml nebulizer solution (has no administration in time range)   albuterol-ipratropium (DUO-NEB) 2.5 MG-0.5 MG/3 ML (3 mL Nebulization Given 10/19/20 4273)   predniSONE (DELTASONE) tablet 60 mg (60 mg Oral Given 10/19/20 0453)   albuterol-ipratropium (DUO-NEB) 2.5 MG-0.5 MG/3 ML (3 mL Nebulization Given 10/19/20 0537)       RADIOLOGY INTERPRETATIONS  XR CHEST PORT   Final Result   IMPRESSION:      No active cardiopulmonary disease. EKG READINGS/LABORATORY RESULTS  No results found for this or any previous visit (from the past 12 hour(s)). ED DIAGNOSIS & DISPOSITION INFORMATION  Diagnosis:   1.  Mild intermittent asthma with acute exacerbation Disposition: Discharged    Follow-up Information     Follow up With Specialties Details Why Contact Info    Naun Samson Physician Assistant Schedule an appointment as soon as possible for a visit  [phone]      [facility] EMERGENCY DEPT Emergency Medicine  As needed [address]  [phone]          Patient's Medications   Start Taking    PREDNISONE (DELTASONE) 20 MG TABLET    Take 60 mg by mouth daily for 5 days. Continue Taking    ALBUTEROL IN    Take  by inhalation as needed. AMLODIPINE (NORVASC) 5 MG TABLET    Take 5 mg by mouth daily. Indications: HYPERTENSION    B. INFANTIS-B. ANI-B. LONG-B.BIFI (PROBIOTIC 4X) 10-15 MG TBEC    Take  by mouth two (2) times a day. HYDROXYZINE HCL (ATARAX) 25 MG TABLET        LEVOTHYROXINE (SYNTHROID) 100 MCG TABLET    Take  by mouth daily (before breakfast). MONTELUKAST SODIUM (SINGULAIR PO)    Take  by mouth. PAROXETINE (PAXIL) 20 MG TABLET    Take  by mouth daily. PSYLLIUM (METAMUCIL) POWD    Take  by mouth two (2) times a day. TRIAMCINOLONE ACETONIDE (KENALOG) 0.1 % TOPICAL CREAM        VALACYCLOVIR (VALTREX) 1 GRAM TABLET    Take 500 mg by mouth daily. These Medications have changed    No medications on file   Stop Taking    ACETAMINOPHEN (TYLENOL) 325 MG TABLET    Take 2 Tabs by mouth every four (4) hours as needed for Pain. CYCLOBENZAPRINE (FLEXERIL) 5 MG TABLET    Take 1 Tab by mouth three (3) times daily as needed for Muscle Spasm(s). IBUPROFEN (MOTRIN) 600 MG TABLET    Take 1 Tab by mouth every six (6) hours as needed for Pain. LIDOCAINE (LIDODERM) 5 %    Apply patch to the affected area for 12 hours a day and remove for 12 hours a day.            Shirlene Godinez MD.

## 2020-10-19 NOTE — ED NOTES
5:58 AM  10/19/20     Discharge instructions given to patient (name) with verbalization of understanding. Patient accompanied by self. Patient discharged with the following prescriptions Prednisone. Patient discharged to home (destination).       Zandra Krishnamurthy RN

## 2020-10-20 ENCOUNTER — PATIENT OUTREACH (OUTPATIENT)
Dept: CASE MANAGEMENT | Age: 45
End: 2020-10-20

## 2020-10-20 LAB
SARS-COV-2, COV2NT: NOT DETECTED
SOURCE, COVRS: NORMAL
SPECIMEN TYPE, XMCV1T: NORMAL

## 2020-10-20 NOTE — PROGRESS NOTES
Date/Time:  10/20/2020 10:42 AM  Attempted to reach patient by telephone. Left HIPPA compliant message requesting a return call. Will attempt to reach patient again. Covid test pending.

## 2020-10-21 ENCOUNTER — PATIENT OUTREACH (OUTPATIENT)
Dept: CASE MANAGEMENT | Age: 45
End: 2020-10-21

## 2020-10-21 NOTE — PROGRESS NOTES
Patient contacted regarding COVID-19 risk and screening. Discussed COVID-19 related testing which was available at this time. Test results were negative. Patient informed of results, if available? yes     Care Transition Nurse/ Ambulatory Care Manager/ LPN Care Coordinator contacted the patient by telephone to perform follow-up assessment. Verified name and  with patient as identifiers. Patient has following risk factors of: asthma. Symptoms reviewed with patient who verbalized the following symptoms: no new symptoms and no worsening symptoms. Due to no new or worsening symptoms encounter was not routed to provider for escalation. Education provided regarding infection prevention, and signs and symptoms of COVID-19 and when to seek medical attention with patient who verbalized understanding. Discussed exposure protocols and quarantine from [address] you at higher risk for severe illness  and given an opportunity for questions and concerns. The patient agrees to contact the COVID-19 hotline [phone] or PCP office for questions related to their healthcare. CTN/ACM/LPN provided contact information for future reference. From CDC: Are you at higher risk for severe illness?  Wash your hands often.  Avoid close contact (6 feet, which is about two arm lengths) with people who are sick.  Put distance between yourself and other people if COVID-19 is spreading in your community.  Clean and disinfect frequently touched surfaces.  Avoid all cruise travel and non-essential air travel.  Call your healthcare professional if you have concerns about COVID-19 and your underlying condition or if you are sick. For more information on steps you can take to protect yourself, see CDC's How to [place] for follow-up call in 7-14 days based on severity of symptoms and risk factors.

## 2020-11-03 ENCOUNTER — PATIENT OUTREACH (OUTPATIENT)
Dept: CASE MANAGEMENT | Age: 45
End: 2020-11-03

## 2020-11-03 NOTE — PROGRESS NOTES
Patient resolved from Transition of Care episode on 11/3/2020  Discussed COVID-19 related testing which was available at this time. Test results were negative. Patient informed of results, if available? yes     Patient/family has been provided the following resources and education related to COVID-19:                         Signs, symptoms and red flags related to COVID-19            CDC exposure and quarantine guidelines            [facility] exposure contact - [phone]            Contact for their local Department of Health                 Patient currently reports that the following symptoms have improved:  shortness of breath, no new symptoms and no worsening symptoms. Patient has appt with pulmonary on 11/12/2020. No further outreach scheduled with this CTN/ACM/LPN/HC/ MA. Episode of Care resolved. Patient has this CTN/ACM/LPN/HC/MA contact information if future needs arise.

## 2020-11-13 ENCOUNTER — HOSPITAL ENCOUNTER (OUTPATIENT)
Dept: LAB | Age: 45
Discharge: HOME | End: 2020-11-13
Payer: COMMERCIAL

## 2020-11-13 LAB
BASOPHILS # BLD: 0 K/UL (ref 0–0.1)
BASOPHILS NFR BLD: 0 % (ref 0–2)
DIFFERENTIAL METHOD BLD: NORMAL
EOSINOPHIL # BLD: 0.2 K/UL (ref 0–0.4)
EOSINOPHIL NFR BLD: 3 % (ref 0–5)
ERYTHROCYTE [DISTWIDTH] IN BLOOD BY AUTOMATED COUNT: 13.2 % (ref 11.6–14.5)
HCT VFR BLD AUTO: 44.4 % (ref 35–45)
HGB BLD-MCNC: 14.4 G/DL (ref 12–16)
LYMPHOCYTES # BLD: 2.2 K/UL (ref 0.9–3.6)
LYMPHOCYTES NFR BLD: 29 % (ref 21–52)
MCH RBC QN AUTO: 30 PG (ref 24–34)
MCHC RBC AUTO-ENTMCNC: 32.4 G/DL (ref 31–37)
MCV RBC AUTO: 92.5 FL (ref 74–97)
MONOCYTES # BLD: 0.7 K/UL (ref 0.05–1.2)
MONOCYTES NFR BLD: 9 % (ref 3–10)
NEUTS SEG # BLD: 4.5 K/UL (ref 1.8–8)
NEUTS SEG NFR BLD: 59 % (ref 40–73)
PLATELET # BLD AUTO: 303 K/UL (ref 135–420)
PMV BLD AUTO: 10.9 FL (ref 9.2–11.8)
RBC # BLD AUTO: 4.8 M/UL (ref 4.2–5.3)
WBC # BLD AUTO: 7.6 K/UL (ref 4.6–13.2)

## 2020-11-13 PROCEDURE — 36415 COLL VENOUS BLD VENIPUNCTURE: CPT

## 2020-11-13 PROCEDURE — 86003 ALLG SPEC IGE CRUDE XTRC EA: CPT

## 2020-11-13 PROCEDURE — 85025 COMPLETE CBC W/AUTO DIFF WBC: CPT

## 2020-11-13 PROCEDURE — 82785 ASSAY OF IGE: CPT

## 2020-11-13 PROCEDURE — 86606 ASPERGILLUS ANTIBODY: CPT

## 2020-11-16 ENCOUNTER — TRANSCRIBE ORDER (OUTPATIENT)
Dept: SCHEDULING | Age: 45
End: 2020-11-16

## 2020-11-16 DIAGNOSIS — J67.2 BIRD FANCIER'S LUNG (HCC): Primary | ICD-10-CM

## 2020-11-17 LAB
A ALTERNATA IGE QN: <0.1 KU/L
A FUMIGATUS IGE QN: <0.1 KU/L
AMER ROACH IGE QN: 0.16 KU/L
AMER SYCAMORE IGE QN: <0.1 KU/L
BAHIA GRASS IGE QN: 3.78 KU/L
BERMUDA GRASS IGE QN: 2.14 KU/L
BOXELDER IGE QN: 0.61 KU/L
C HERBARUM IGE QN: <0.1 KU/L
CAT DANDER IGG QN: <0.1 KU/L
CLASS DESCRIPTION, 600268: ABNORMAL
COMMON RAGWEED IGE QN: <0.1 KU/L
D FARINAE IGE QN: 0.23 KU/L
D PTERONYSS IGE QN: 0.35 KU/L
DEPRECATED IGE QN: <0.1 KU/L
DOG DANDER IGE QN: <0.1 KU/L
ENGL PLANTAIN IGE QN: <0.1 KU/L
IGE SERPL-ACNC: 176 IU/ML (ref 6–495)
JOHNSON GRASS IGE QN: 1.65 KU/L
M RACEMOSUS IGE QN: 0.13 KU/L
MT JUNIPER IGE QN: <0.1 KU/L
MUGWORT IGE QN: <0.1 KU/L
NETTLE IGE QN: <0.1 KU/L
P NOTATUM IGE QN: 0.13 KU/L
S BOTRYOSUM IGE QN: <0.1 KU/L
SHEEP SORREL IGE QN: 0.14 KU/L
SWEET GUM IGE QN: <0.1 KU/L
TIMOTHY IGE QN: 5.82 KU/L
WHITE BIRCH IGE QN: <0.1 KU/L
WHITE ELM IGG QN: <0.1 KU/L
WHITE HICKORY IGE QN: 3.68 KU/L
WHITE MULBERRY IGE QN: <0.1 KU/L
WHITE OAK IGE QN: <0.1 KU/L

## 2020-11-18 LAB
A FUMIGATUS1 AB SER QL ID: NEGATIVE
A PULLULANS AB SER QL: NEGATIVE
LACEYELLA SACCHARI AB SER QL: NEGATIVE
PIGEON SERUM AB QL ID: NEGATIVE
S RECTIVIRGULA AB SER QL ID: NEGATIVE
T VULGARIS AB SER QL ID: NEGATIVE

## 2020-11-25 ENCOUNTER — HOSPITAL ENCOUNTER (OUTPATIENT)
Dept: CT IMAGING | Age: 45
Discharge: HOME | End: 2020-11-25
Attending: INTERNAL MEDICINE
Payer: COMMERCIAL

## 2020-11-25 DIAGNOSIS — J67.2 BIRD FANCIER'S LUNG (HCC): ICD-10-CM

## 2020-11-25 PROCEDURE — 71250 CT THORAX DX C-: CPT

## 2022-03-18 PROBLEM — N63.20 BREAST MASS, LEFT: Status: ACTIVE | Noted: 2018-01-16

## 2022-03-18 PROBLEM — N64.3 GALACTORRHEA IN FEMALE: Status: ACTIVE | Noted: 2018-01-16

## 2022-03-19 PROBLEM — D24.2 FIBROADENOMA OF BREAST, LEFT: Status: ACTIVE | Noted: 2018-01-30

## 2022-05-05 ENCOUNTER — HOSPITAL ENCOUNTER (OUTPATIENT)
Dept: PHYSICAL THERAPY | Age: 47
Discharge: HOME | End: 2022-05-05
Payer: COMMERCIAL

## 2022-05-05 PROCEDURE — 97161 PT EVAL LOW COMPLEX 20 MIN: CPT

## 2022-05-05 NOTE — PROGRESS NOTES
PHYSICAL THERAPY - DAILY TREATMENT NOTE    Patient Name: Katia Wadsworth        Date: 2022  : 1975   YES Patient  Verified  Visit #:     Insurance: Payor: Gumaro Harvey / Plan: [address] [place] [address] / Product Type: PPO /      In time: 2:25 Out time: 2:45   Total Treatment Time: 20     Medicare Time Tracking (below)   Total Timed Codes (min):  0 1:1 Treatment Time:  0     TREATMENT AREA = Other low back pain [M54.59]    SUBJECTIVE    Pain Level (on 0 to 10 scale):  6   10   Medication Changes/New allergies or changes in medical history, any new surgeries or procedures? NO    If yes, update Summary List   Subjective Functional Status/Changes:  []  No changes reported   Pt is a 55year old female who presents to PT today with c/o lower back pain. DOI: a month ago. LANA: none. Intermittent shooting pain referring down posterior lateral RLE to knee made worse with standing/walking. Pt reports seeing her doctor recently and pt was diagnosed with a cyst in her back; she is getting a referral to a neurologist to address cyst. Pt reports inability to stand/walk >1 hour, transitioning out of chair, and getting up out of bed. Has been walking with a cane recently due to legs giving out underneath her - she admits to falling but cannot recall the most recent fall. Alleviating factors include sitting and gabapentin. Pt was here approximately 2-3 years ago for similar pain, found PT beneficial.   Other:  Pt is currently on FMLA for job, works at [employer] 12 hours a day. She reports she is scheduled to return . Pain  Today:10  Best:3/10  Worst:10/10  Numbness/tingling:intermittent RLE         OBJECTIVE  Observation/Posture: shifts weight frequently in seated position     Palpation:TTP to R posterior hip and lumbar spine     Gait: [] Normal     [x] Abnormal:antalgic gait pattern     5x sit<>stand:deferred.  Significant pain observed each time she transitions from sit to stand, uses BUE for support to stand. Active Movements:   ROM  AROM Comments:pain, area   Forward flexion 40-60 Mildly limited  p! Extension 20-30 Moderately limited  p! SB right 20-30 WFL    SB left 20-30 WFL    Rotation right 5-10 WFL p! Rotation left 5-10 WFL      LE Strength: generalized 4+/5 except hip extension and hip abduction bilaterally (3+/5 p!)    Dural Mobility:         Slump Test: [x] R    [] L    [x] +    [] -  @ (degrees):     Flexibility Deficits:   Ely's:  [x] R    [x] L    [x] +    [] -        Therapeutic Procedures:  Min Procedure Specifics + Rationale   n/a [x]  Patient Education (performed throughout session) [x] Review HEP    [] Progressed/Changed HEP based on:   [] proper performance and advancement of Therex/TA   [] reduction in pain level    [] increased functional capacity       [] change in directional preference       Post Treatment Pain Level (on 0 to 10) scale:   6  / 10     ASSESSMENT    Assessment/Changes in Function: Clinically, patient's signs and symptoms are consistent with LBP. Skilled physical therapy is indicated to address noted deficits. Rehabilitation will address limitations noted in evaluation, follow MD orders and work toward improving lumbar and core strength, stability, and mobility so that patient may return to desired activities safely and with less discomfort.      Justification for Eval Code Complexity: low  Patient History (low 0, mod 1-2, high 3-4): high   Examination (low 1-2, mod 3+, high 4+): low   Clinical Presentation (low: stable/uncomplicated; mod: evolving; high: unstable/unpredictable): low  Clinical Decision Making (low , mod 26-74, high 1-25): mod    [x]  See Plan of Care  []  See Progress Note/ Recertification  []  See Discharge Summary        Patient will continue to benefit from skilled PT services to modify and progress therapeutic interventions, address functional mobility deficits, address ROM deficits, address strength deficits, analyze and address soft tissue restrictions, analyze and cue movement patterns, analyze and modify body mechanics/ergonomics, and assess and modify postural abnormalities  to attain remaining goals   Progress toward goals / Updated goals:    See POC     PLAN    [x]  Upgrade activities as tolerated  [x]  Update interventions per flow sheet YES Continue plan of care   []  Discharge due to :    []  Other:        Therapist: Brittney Sheehan PT, DPT    Date: 5/5/2022 Time: 2:20 PM

## 2022-05-05 NOTE — PROGRESS NOTES
[address] [facility] PHYSICAL THERAPY  [address] [facility] [address], [address] - Phone: [phone]  Fax: [phone] / [address] [facility]  Patient Name: Isa Khan : 1975   Medical   Diagnosis: Other low back pain [M54.59] Treatment Diagnosis: LBP   Onset Date: One month     Referral Source: [facility]): 2022   Prior Hospitalization: See medical history Provider #: [id number]   Prior Level of Function: Independent    Comorbidities: OA, LBP, HTN   Medications: Verified on Patient Summary List   The Plan of Care and following information is based on the information from the initial evaluation.   ==========================================================================================  Assessment / key information:  Pt is a 55year old female who presents to PT today with c/o lower back pain. DOI: a month ago. LANA: none. Intermittent shooting pain referring down posterior lateral RLE to knee made worse with standing/walking. Pt reports seeing her doctor recently and pt was diagnosed with a cyst in her back; she is getting a referral to a neurologist to address cyst. Pt reports inability to stand/walk >1 hour, transitioning out of chair, and getting up out of bed. Has been walking with a cane recently due to legs giving out underneath her - she admits to falling but cannot recall the most recent fall. Alleviating factors include sitting and gabapentin.      Pt was here approximately 2-3 years ago for similar pain, found PT beneficial.   Other:  Pt is currently on FMLA for job, works at [employer] 12 hours a day.  She reports she is scheduled to return .      Pain  Today:6/10  Best:3/10  Worst:10/10  Numbness/tingling:intermittent RLE    Observation/Posture: shifts weight frequently in seated position      Palpation:TTP to R posterior hip and lumbar spine      Gait:    []? Normal       [x]? Abnormal:antalgic gait pattern      5x sit<>stand:deferred. Significant pain observed each time she transitions from sit to stand, uses BUE for support to stand.      Active Movements:   ROM  AROM Comments:pain, area   Forward flexion 40-60 Mildly limited  p! Extension 20-30 Moderately limited  p! SB right 20-30 WFL     SB left 20-30 WFL     Rotation right 5-10 WFL p! Rotation left 5-10 WFL        LE Strength: generalized 4+/5 except hip extension and hip abduction bilaterally (3+/5 p!)     Dural Mobility:                          Slump Test:     [x]? R    []? L    [x]? +    []? -  @ (degrees):      Flexibility Deficits:      Ely's:                [x]? R    [x]? L    [x]? +    []? -    Patient with a Functional Status score of 34 on FOTO (Focused on Therapeutic Outcomes), which corresponds to a functional limitation of 66%. Pt was provided a HEP today and educated regarding their diagnosis and prognosis. Clinically, patient's signs and symptoms are consistent with LBP. Skilled physical therapy is indicated to address noted deficits. Rehabilitation will address limitations noted in evaluation, follow MD orders and work toward improving lumbar and core strength, stability, and mobility so that patient may return to desired activities safely and with less discomfort.  Thank you for the opportunity to assist in this case.     ==========================================================================================  Eval Complexity: History: HIGH Complexity :3+ comorbidities / personal factors will impact the outcome/ POC Exam:LOW Complexity : 1-2 Standardized tests and measures addressing body structure, function, activity limitation and / or participation in recreation  Presentation: LOW Complexity : Stable, uncomplicated  Clinical Decision Making:MEDIUM Complexity : FOTO score of 26-74Overall Complexity:LOW     Problem List: pain affecting function, decrease ROM, decrease strength, impaired gait/ balance, decrease ADL/ functional abilitiies, decrease activity tolerance, decrease flexibility/ joint mobility and decrease transfer abilities   Treatment Plan may include any combination of the following: Therapeutic exercise, Therapeutic activities, Neuromuscular re-education, Physical agent/modality, Gait/balance training, Manual therapy, Patient education, Self Care training, Functional mobility training, Home safety training and Stair training  Patient / Family readiness to learn indicated by: asking questions, trying to perform skills and interest  Persons(s) to be included in education: patient (P)  Barriers to Learning/Limitations: None  Patient Goal (s): To reduce pain so she can return to work   Patient self reported health status: fair  Rehabilitation Potential: good       Short Term Goals: To be accomplished in  3  weeks:  Goal/Measure of Progress Goal Met? 1. Pt will be compliant with HEP for symptom management at home. Status at last Eval: NA Current Status: Unable n/a   2. Patient will self report reduction of pain level at worst to 5/10 to allow for restful night's sleep. Status at last Eval: NA Current Status: 10/10 n/a      Long Term Goals: To be accomplished in  6  weeks:  Goal/Measure of Progress Goal Met? 1. Pt will be independent with HEP at D/C for self management. Status at last Eval: NA Current Status: Unable n/a   2. Patient will demonstrate improved ability to transition from sit to stand at least 5x with minimal use of hands to improve transitional abilities. Status at last Eval: NA Current Status: 1x p! n/a   3. Patient will demonstrate improved hip abduction strength to at least 4/5 to allow for improved standing/walking tolerance. Status at last Eval: NA Current Status: 3+/5 n/a   4. Patient will increase FOTO Functional Status score to 52 to decrease functional limitations.    Status at last Eval: NA Current Status: 34 n/a       Frequency / Duration:   Patient to be seen  2  times per week for 6  weeks:  Patient / Caregiver education and instruction: provided education regarding diagnosis and prognosis as well as details regarding treatment plain. self care, activity modification and exercises  Therapist Signature: Ramandeep Barfield PT, DPNERY Date: 4/8/9100   Certification Period: na Time: 2:19 PM   ===========================================================================================  I certify that the above Physical Therapy Services are being furnished while the patient is under my care. I agree with the treatment plan and certify that this therapy is necessary. Physician Signature:        Date:       Time:     Please sign and return to [facility] or you may fax the signed copy to [phone]. Thank you.   Naun Hernandes

## 2022-05-09 ENCOUNTER — HOSPITAL ENCOUNTER (OUTPATIENT)
Dept: PHYSICAL THERAPY | Age: 47
Discharge: HOME | End: 2022-05-09
Payer: COMMERCIAL

## 2022-05-09 PROCEDURE — 97110 THERAPEUTIC EXERCISES: CPT

## 2022-05-09 NOTE — PROGRESS NOTES
PHYSICAL THERAPY - DAILY TREATMENT NOTE    Patient Name: Hema Lee        Date: 2022  : 1975   YES Patient  Verified  Visit #:   2   of     Insurance: Payor: T[place]  / Plan: [address] [place] [address] / Product Type: PPO /      In time: 3:20early Out time: 3:55   Total Treatment Time: 35     Medicare/BCBS Time Tracking (below)   Total Timed Codes (min):  35 1:1 Treatment Time:  25     TREATMENT AREA = Other low back pain [M54.59]    SUBJECTIVE    Pain Level (on 0 to 10 scale):  7  / 10   Medication Changes/New allergies or changes in medical history, any new surgeries or procedures? NO    If yes, update Summary List   Subjective Functional Status/Changes:  []  No changes reported     \"Not much changed since last time\"          OBJECTIVE     Therapeutic Procedures:  Min Procedure Specifics + Rationale   n/a [x]  Patient Education (performed throughout session) [x] Review HEP    [] Progressed/Changed HEP based on:   [] proper performance and advancement of Therex/TA   [] reduction in pain level    [] increased functional capacity       [] change in directional preference   25 [x] Therapeutic Exercise   [x]  See Flowsheet   Rationale: increase ROM and increase strength to improve the patients ability to participate in ADL's        Modality rationale: decrease inflammation, decrease pain, increase tissue extensibility and increase muscle contraction/control to improve the patients ability to perform ADL's with greater ease     Min Type Additional Details   10 [x]  Heat         [] pre-LEXA      [x] post-LEXA Location:L/S    [x] supine             [] prone     [x] legs elevated  [] legs flat  [] sitting              [] sidelying - [] left [] right   [x] Skin assessment post-treatment:  [x]intact [x]redness- no adverse reaction       []redness  adverse reaction:       Other Objective/Functional Measures:    Initiated therex per flow sheet. Educated patient re: DOMS.         Post Treatment Pain Level (on 0 to 10) scale:   2-3  / 10     ASSESSMENT    Assessment/Changes in Function:       Notable lack of mobility on right hip vs left during h/l piriformis         Patient will continue to benefit from skilled PT services to modify and progress therapeutic interventions, address functional mobility deficits, address ROM deficits, address strength deficits, analyze and address soft tissue restrictions, analyze and cue movement patterns, analyze and modify body mechanics/ergonomics and instruct in home and community integration  to attain remaining goals   Progress toward goals / Updated goals:    1st session since initial eval, no significant progress noted in return to function. PLAN    [x]  Upgrade activities as tolerated  [x]  Update interventions per flow sheet YES Continue plan of care   []  Discharge due to :    []  Other:      Therapist: Tram Redding \"BJ\" MICHA Garcia, Cert. MDT, Cert. DN, Cert. SMT, Dip.  Osteopractic    Date: 5/9/2022 Time: 3:25 PM     Future Appointments   Date Time Provider Alin Mcfadden   5/9/2022  3:30 PM Abhishek Jackson PT [facility]   5/13/2022 10:15 AM Shelby Hennessy, PT [facility]   5/17/2022  2:00 PM [address] [facility]   5/19/2022  2:00 PM Rosaura Colindres, PT [facility]   5/23/2022  2:00 PM [address] [facility]   5/26/2022  2:00 PM Rosaura Colinders, PT [facility]

## 2022-05-11 ENCOUNTER — APPOINTMENT (OUTPATIENT)
Dept: PHYSICAL THERAPY | Age: 47
End: 2022-05-11
Payer: COMMERCIAL

## 2022-05-13 ENCOUNTER — HOSPITAL ENCOUNTER (OUTPATIENT)
Dept: PHYSICAL THERAPY | Age: 47
Discharge: HOME | End: 2022-05-13
Payer: COMMERCIAL

## 2022-05-13 PROCEDURE — 97110 THERAPEUTIC EXERCISES: CPT

## 2022-05-13 NOTE — PROGRESS NOTES
PHYSICAL THERAPY - DAILY TREATMENT NOTE    Patient Name: Dashawn Genre        Date: 2022  : 1975   YES Patient  Verified  Visit #:    3     Insurance: Payor: Charlie  / Plan: [address] [place] [address] / Product Type: PPO /      In time: 10:15 Out time: 10:55   Total Treatment Time: 40     Medicare/BCBS [place] Time Tracking (below)   Total Timed Codes (min):  40 1:1 Treatment Time:  40     TREATMENT AREA =  Other low back pain [M54.59]  SUBJECTIVE    Pain Level (on 0 to 10 scale):  -8  / 10   Medication Changes/New allergies or changes in medical history, any new surgeries or procedures? NO    If yes, update Summary List   Subjective Functional Status/Changes:  []  No changes reported     Pt reports that she is only working at one of her jobs, at [employer] because of her pain. Pt reports that she is only working 5 hours at a time, but she has back pain while working. Pt reports that she tries to sit when she can, but she has to stand up a lot if it is busy.         OBJECTIVE    Modalities Rationale:  decrease pain to improve patient's ability to perform ADLs/IADLs, functional mobility and gait safely and independently without increased pain/symptoms       min - Estim, type/location:                                      -  att     -  unatt     -  w/US     -  w/ice    -  w/heat    min -  Mechanical Traction: type/lbs                   -  pro   -  sup   -  int   -  cont    -  before manual    -  after manual    min -  Ultrasound, settings/location:      min -  Iontophoresis w/ dexamethasone, location:                                               -  take home patch       -  in clinic   10 min -  Ice     X  Heat    location/position: L/S, supine with wedge under B LE    min -  Vasopneumatic Device, press/temp:     min -  Other:    [x] Skin assessment post-treatment (if applicable):    [x]  intact    []  redness- no adverse reaction     []redness  adverse reaction:      30 min Therapeutic Exercise:  [x]  See flow sheet   Rationale:      increase ROM and increase strength to improve the patients ability to perform ADLs/IADLs, functional mobility and gait safely and independently without increased pain/symptoms     During TE min Patient Education:  YES  Reviewed HEP   [x]  Progressed/Changed HEP based on:   Issued HEP     Other Objective/Functional Measures:    Exercises per flow sheet     Post Treatment Pain Level (on 0 to 10) scale:   2  / 10     ASSESSMENT    Assessment/Changes in Function:     Patient with c/o fatigue and muscle soreness throughout session consistent with significant deconditioning     []  See Progress Note/Recertification   Patient will continue to benefit from skilled PT services to modify and progress therapeutic interventions, address functional mobility deficits, address ROM deficits, address strength deficits, analyze and address soft tissue restrictions, analyze and cue movement patterns, analyze and modify body mechanics/ergonomics, assess and modify postural abnormalities, address imbalance/dizziness and instruct in home and community integration to attain remaining goals. Progress toward goals / Updated goals:    Progressing slowly toward goals: · Short Term Goals: To be accomplished in  3  weeks:          Goal/Measure of Progress Goal Met? 1. Pt will be compliant with HEP for symptom management at home. Status at last Eval: NA Current Status: Initiated progressing   2. Patient will self report reduction of pain level at worst to 5/10 to allow for restful night's sleep. Status at last Eval: NA Current Status: 7-8/10 progressing      · Long Term Goals: To be accomplished in  6  weeks:          Goal/Measure of Progress Goal Met? 1. Pt will be independent with HEP at D/C for self management. Status at last Eval: NA Current Status: Unable n/a   2.   Patient will demonstrate improved ability to transition from sit to stand at least 5x with minimal use of hands to improve transitional abilities. Status at last Eval: NA Current Status: 1x p! n/a   3. Patient will demonstrate improved hip abduction strength to at least 4/5 to allow for improved standing/walking tolerance. Status at last Eval: NA Current Status: 3+/5 n/a   4. Patient will increase FOTO Functional Status score to 52 to decrease functional limitations.    Status at last Eval: NA Current Status: 34 n/a           PLAN    [x]  Upgrade activities as tolerated YES Continue plan of care   []  Discharge due to :    []  Other:      Therapist: Criss Cesar PT    Date: 5/13/2022 Time: 10:15 AM     Future Appointments   Date Time Provider Alin Mcfadden   5/20/2022  1:15 PM Emanuel Greenwood, PT [facility]   5/23/2022  2:00 PM [address] [facility]   5/25/2022  3:30 PM Abdias January, PTA [id number]

## 2022-05-17 ENCOUNTER — APPOINTMENT (OUTPATIENT)
Dept: PHYSICAL THERAPY | Age: 47
End: 2022-05-17
Payer: COMMERCIAL

## 2022-05-19 ENCOUNTER — APPOINTMENT (OUTPATIENT)
Dept: PHYSICAL THERAPY | Age: 47
End: 2022-05-19
Payer: COMMERCIAL

## 2022-05-20 ENCOUNTER — HOSPITAL ENCOUNTER (OUTPATIENT)
Dept: PHYSICAL THERAPY | Age: 47
Discharge: HOME | End: 2022-05-20
Payer: COMMERCIAL

## 2022-05-20 PROCEDURE — 97110 THERAPEUTIC EXERCISES: CPT

## 2022-05-20 NOTE — PROGRESS NOTES
PHYSICAL THERAPY - DAILY TREATMENT NOTE    Patient Name: Clare Palacio        Date: 2022  : 1975   YES Patient  Verified  Visit #:   4     Insurance: Payor: Regino Shah / Plan: [address] [place] [address] / Product Type: PPO /      In time: 1:17 Out time: 2:00   Total Treatment Time: 43     Medicare/[employer] [place] Time Tracking (below)   Total Timed Codes (min):  33 1:1 Treatment Time:  33     TREATMENT AREA =  Other low back pain [M54.59]  SUBJECTIVE    Pain Level (on 0 to 10 scale):  0  / 10   Medication Changes/New allergies or changes in medical history, any new surgeries or procedures? NO    If yes, update Summary List   Subjective Functional Status/Changes:  []  No changes reported     Pt denies pain at this time, reports that she had knee pain yesterday because she was doing a lot of walking. Pt reports intermittent urinary incontinence, asks if there are exercises to help with that.        OBJECTIVE    Modalities Rationale:  decrease pain to improve patient's ability to perform ADLs/IADLs, functional mobility and gait safely and independently without increased pain/symptoms       min [] Estim, type/location:                                      []  att     []  unatt     []  w/US     []  w/ice    []  w/heat    min []  Mechanical Traction: type/lbs                   []  pro   []  sup   []  int   []  cont    []  before manual    []  after manual    min []  Ultrasound, settings/location:      min []  Iontophoresis w/ dexamethasone, location:                                               []  take home patch       []  in clinic   10 min []  Ice     [x]  Heat    location/position: L/S, supine with wedge under B LE    min []  Vasopneumatic Device, press/temp:     min []  Other:    [x] Skin assessment post-treatment (if applicable):    [x]  intact    []  redness- no adverse reaction     []redness  adverse reaction:      33 min Therapeutic Exercise:  [x]  See flow sheet   Rationale: increase ROM, increase strength, improve coordination, improve balance and increase proprioception to improve the patients ability to perform ADLs/IADLs, functional mobility and gait safely and independently without increased pain/symptoms     During TE min Patient Education:  YES  Reviewed HEP   []  Progressed/Changed HEP based on:   Cont HEP; advised pt to discuss incontinence with MD, gave info regarding women's health program     Other Objective/Functional Measures:    Exercises per flow sheet     Post Treatment Pain Level (on 0 to 10) scale:   2  / 10     ASSESSMENT    Assessment/Changes in Function:     Tolerated exercises without complaints     []  See Progress Note/Recertification   Patient will continue to benefit from skilled PT services to modify and progress therapeutic interventions, address functional mobility deficits, address ROM deficits, address strength deficits, analyze and address soft tissue restrictions, analyze and cue movement patterns, analyze and modify body mechanics/ergonomics, assess and modify postural abnormalities, address imbalance/dizziness and instruct in home and community integration to attain remaining goals. Progress toward goals / Updated goals:    Progressing toward goals: · Short Term Goals: To be accomplished in  3  weeks:                Goal/Measure of Progress Goal Met? 1.  Pt will be compliant with HEP for symptom management at home. Status at last Eval: NA Current Status: Initiated progressing   2.  Patient will self report reduction of pain level at worst to 5/10 to allow for restful night's sleep.    Status at last Eval: NA Current Status: 7-8/10 progressing      · Long Term Goals: To be accomplished in  6  weeks:                Goal/Measure of Progress Goal Met? 1.  Pt will be independent with HEP at D/C for self management.    Status at last Eval: NA Current Status: Unable n/a   2.  Patient will demonstrate improved ability to transition from sit to stand at least 5x with minimal use of hands to improve transitional abilities.    Status at last Eval: NA Current Status: 1x p! n/a   3.  Patient will demonstrate improved hip abduction strength to at least 4/5 to allow for improved standing/walking tolerance.    Status at last Eval: NA Current Status: 3+/5 n/a   4.  Patient will increase FOTO Functional Status score to 52 to decrease functional limitations.    Status at last Eval: NA Current Status: 34 n/a         PLAN    [x]  Upgrade activities as tolerated YES Continue plan of care   []  Discharge due to :    []  Other:      Therapist: He Stout PT    Date: 2022 Time: 1:20 PM     Future Appointments   Date Time Provider Alin Mcfadden   2022  2:00 PM [address] [facility]   2022  3:30 PM HenlineSue Apgar, PTA [facility]

## 2022-05-23 ENCOUNTER — APPOINTMENT (OUTPATIENT)
Dept: PHYSICAL THERAPY | Age: 47
End: 2022-05-23
Payer: COMMERCIAL

## 2022-05-25 ENCOUNTER — HOSPITAL ENCOUNTER (OUTPATIENT)
Dept: PHYSICAL THERAPY | Age: 47
Discharge: HOME | End: 2022-05-25
Payer: COMMERCIAL

## 2022-05-25 PROCEDURE — 97110 THERAPEUTIC EXERCISES: CPT

## 2022-05-25 NOTE — PROGRESS NOTES
PHYSICAL THERAPY - DAILY TREATMENT NOTE    Patient Name: Anna Vizcarra        Date: 2022  : 1975   YES Patient  Verified  Visit #:     Insurance: Payor: Tsering Santos / Plan: [address] [place] [address] / Product Type: PPO /      In time: 10:30 Out time: 11:15   Total Treatment Time: 45     Medicare/[facility] Time Tracking (below)   Total Timed Codes (min):  35 1:1 Treatment Time:  35     TREATMENT AREA =  Other low back pain [M54.59]  SUBJECTIVE    Pain Level (on 0 to 10 scale):  6  / 10   Medication Changes/New allergies or changes in medical history, any new surgeries or procedures? NO    If yes, update Summary List   Subjective Functional Status/Changes:  []  No changes reported     Pt reports 6/10 LBP. Pt reports that pain varies in intensity depending on what she is doing.           OBJECTIVE    Modalities Rationale:  decrease pain to improve patient's ability to perform ADLs/IADLs, functional mobility and gait safely and independently without increased pain/symptoms       min [] Estim, type/location:                                      []  att     []  unatt     []  w/US     []  w/ice    []  w/heat    min []  Mechanical Traction: type/lbs                   []  pro   []  sup   []  int   []  cont    []  before manual    []  after manual    min []  Ultrasound, settings/location:      min []  Iontophoresis w/ dexamethasone, location:                                               []  take home patch       []  in clinic   10 min []  Ice     [x]  Heat    location/position: L/S, supine with wedge under B LE    min []  Vasopneumatic Device, press/temp:     min []  Other:    [x] Skin assessment post-treatment (if applicable):    [x]  intact    []  redness- no adverse reaction     []redness  adverse reaction:      35 min Therapeutic Exercise:  [x]  See flow sheet   Rationale:      increase ROM, increase strength, improve coordination, improve balance and increase proprioception to improve the patients ability to perform ADLs/IADLs, functional mobility and gait safely and independently without increased pain/symptoms     During TE min Patient Education:  YES  Reviewed HEP   []  Progressed/Changed HEP based on:   Cont HEP     Other Objective/Functional Measures:    Exercises per flow sheet     Post Treatment Pain Level (on 0 to 10) scale:   2  / 10     ASSESSMENT    Assessment/Changes in Function:     Tolerated exercises with minimal complaints     []  See Progress Note/Recertification   Patient will continue to benefit from skilled PT services to modify and progress therapeutic interventions, address functional mobility deficits, address ROM deficits, address strength deficits, analyze and address soft tissue restrictions, analyze and cue movement patterns, analyze and modify body mechanics/ergonomics, assess and modify postural abnormalities, address imbalance/dizziness and instruct in home and community integration to attain remaining goals. Progress toward goals / Updated goals:    Progressing toward goals: · Short Term Goals: To be accomplished in  3  weeks:                         Goal/Measure of Progress Goal Met? 1.  Pt will be compliant with HEP for symptom management at home. Status at last Eval: NA Current Status: Initiated progressing   2.  Patient will self report reduction of pain level at worst to 5/10 to allow for restful night's sleep.    Status at last Eval: NA Current Status: 6/10 progressing      · Long Term Goals: To be accomplished in  6  weeks:                         Goal/Measure of Progress Goal Met? 1.  Pt will be independent with HEP at D/C for self management.    Status at last Eval: NA Current Status: Unable n/a   2.  Patient will demonstrate improved ability to transition from sit to stand at least 5x with minimal use of hands to improve transitional abilities.    Status at last Eval: NA Current Status: 1x p! n/a   3.  Patient will demonstrate improved hip abduction strength to at least 4/5 to allow for improved standing/walking tolerance.    Status at last Eval: NA Current Status: 3+/5 n/a   4.  Patient will increase FOTO Functional Status score to 52 to decrease functional limitations.    Status at last Eval: NA Current Status: 34 n/a         PLAN    [x]  Upgrade activities as tolerated YES Continue plan of care   []  Discharge due to :    []  Other:      Therapist: Davonte Cole PT    Date: 5/25/2022 Time: 10:32 AM     Future Appointments   Date Time Provider Alin Mcfadden   5/27/2022  9:30 AM Nick Nissen, PTA [facility]   6/1/2022  3:30 PM Nickane Nissen, PTA [facility]   6/3/2022 11:45 AM Keane Nissen, PTA [facility]   6/6/2022  3:30 PM Tom Azar [facility]   6/10/2022  9:30 AM Nick Nissen, PTA [facility]   6/13/2022  3:30 PM Nick Nissen, PTA [facility]   6/17/2022 10:15 AM Tom Azar [facility]   6/20/2022  3:30 PM Keane Nissen, PTA [facility]   6/24/2022 11:45 AM Tom Azar [facility]   6/27/2022  3:30 PM Cristal Larios PTA [facility]

## 2022-05-26 ENCOUNTER — APPOINTMENT (OUTPATIENT)
Dept: PHYSICAL THERAPY | Age: 47
End: 2022-05-26
Payer: COMMERCIAL

## 2022-05-27 ENCOUNTER — HOSPITAL ENCOUNTER (OUTPATIENT)
Dept: PHYSICAL THERAPY | Age: 47
Discharge: HOME | End: 2022-05-27
Payer: COMMERCIAL

## 2022-05-27 PROCEDURE — 97530 THERAPEUTIC ACTIVITIES: CPT

## 2022-05-27 PROCEDURE — 97110 THERAPEUTIC EXERCISES: CPT

## 2022-05-27 NOTE — PROGRESS NOTES
PHYSICAL THERAPY - DAILY TREATMENT NOTE    Patient Name: Daina Blind        Date: 2022  : 1975   yes Patient  Verified  Visit #:     Insurance: Payor: Thalia Elkins / Plan: [address] [place] [address] / Product Type: PPO /      In time: 854 Out time: 950   Total Treatment Time: 56     Medicare/[facility] Time Tracking (below)   Total Timed Codes (min):  46 1:1 Treatment Time:  46     TREATMENT AREA =  Other low back pain [M54.59]    SUBJECTIVE  Pain Level (on 0 to 10 scale):  5  / 10   Medication Changes/New allergies or changes in medical history, any new surgeries or procedures?    no  If yes, update Summary List   Subjective Functional Status/Changes:  []  No changes reported     \"I feel it today but it helps so come here.  I been doing my exercises almost everyday\"          OBJECTIVE  Modalities Rationale:     decrease pain to improve patient's ability to safely perform ADLs, bending/stooping/ lifting; perform prolong sitting/standing/ambulation; and negotiate stairs with no pain or limitations     10 min []  Ice     [x]  Heat    location/position: Supine with wedge under B LEs   [x]Skin assessment post-treatment (if applicable):   [x]  intact    []  redness- no adverse reaction                  []redness  adverse reaction:      36 min Therapeutic Exercise:  [x]  See flow sheet   Rationale:      increase ROM and increase strength to improve the patients ability to safely perform ADLs, bending/stooping/ lifting; perform prolong sitting/standing/ambulation; and negotiate stairs with no pain or limitations        10 min Therapeutic Activity: [x]  See flow sheet   Rationale:    increase ROM, increase strength and improve coordination to improve the patients ability to safely perform ADLs, bending/stooping/ lifting; perform prolong sitting/standing/ambulation; and negotiate stairs with no pain or limitations     Billed With/As:   [x] TE   [x] TA   [] Neuro   [] Self Care Patient Education: [x] Review HEP    [] Progressed/Changed HEP based on:   [] positioning   [] body mechanics   [] transfers   [] heat/ice application    [] other:      Other Objective/Functional Measures:    VCs + demo to perform proper technique and for safety with TE  added OTB to sit <>std without UE from 23''    VCs to decrease both knee anterior translation with mini squats  c/o glut med fatigue with TE   Post Treatment Pain Level (on 0 to 10) scale:   1  / 10     ASSESSMENT  Assessment/Changes in Function:     demos poor posture; improved with VCs  difficulty performing PPT properly, required TCs  demos fair bridge form with no p! []  See Progress Note/Recertification   Patient will continue to benefit from skilled PT services to modify and progress therapeutic interventions, address functional mobility deficits, address ROM deficits, address strength deficits, analyze and address soft tissue restrictions, analyze and cue movement patterns, analyze and modify body mechanics/ergonomics, assess and modify postural abnormalities and instruct in home and community integration to attain remaining goals. Progress toward goals / Updated goals:    Short Term Goals: To be accomplished in  3  weeks:          Goal/Measure of Progress Goal Met? 1. Pt will be compliant with HEP for symptom management at home. Status at last Eval:  NT Current Status: Established        HEP progressing   2. Patient will self report reduction of pain level at worst to 5/10 to allow for restful night's sleep.     Status at last Eval: 10/10 Current Status: 5/10 today progressing           PLAN  [x]  Upgrade activities as tolerated yes Continue plan of care   []  Discharge due to :    []  Other:      Therapist: Nathaniel Ng PTA    Date: 5/27/2022 Time: 10:31 AM     Future Appointments   Date Time Provider Alin Mcfadden   6/1/2022  3:30 PM Heath gabe [facility]   6/3/2022 11:45 AM CUBA MartinezPTCINDY [facility]   6/6/2022  3:30 PM Krysta Babita [facility]   6/10/2022  9:30 AM Sari Gaytan, PTA [facility]   6/13/2022  3:30 PM Sari Rebecading, PTA [facility]   6/17/2022 10:15 AM Jose Alfredo Ovalles [facility]   6/20/2022  3:30 PM Sari Gaytan PTA [facility]   6/24/2022 11:45 AM Jose Alfredo Ovalles [facility]   6/27/2022  3:30 PM Cristal Larios PTA [facility]

## 2022-06-01 ENCOUNTER — APPOINTMENT (OUTPATIENT)
Dept: PHYSICAL THERAPY | Age: 47
End: 2022-06-01
Payer: COMMERCIAL

## 2022-06-03 ENCOUNTER — APPOINTMENT (OUTPATIENT)
Dept: PHYSICAL THERAPY | Age: 47
End: 2022-06-03
Payer: COMMERCIAL

## 2022-06-06 ENCOUNTER — HOSPITAL ENCOUNTER (OUTPATIENT)
Dept: PHYSICAL THERAPY | Age: 47
End: 2022-06-06
Payer: COMMERCIAL

## 2022-06-10 ENCOUNTER — APPOINTMENT (OUTPATIENT)
Dept: PHYSICAL THERAPY | Age: 47
End: 2022-06-10
Payer: COMMERCIAL

## 2022-06-13 ENCOUNTER — APPOINTMENT (OUTPATIENT)
Dept: PHYSICAL THERAPY | Age: 47
End: 2022-06-13
Payer: COMMERCIAL

## 2022-06-17 ENCOUNTER — HOSPITAL ENCOUNTER (OUTPATIENT)
Dept: PHYSICAL THERAPY | Age: 47
Discharge: HOME | End: 2022-06-17
Payer: COMMERCIAL

## 2022-06-17 PROCEDURE — 97110 THERAPEUTIC EXERCISES: CPT

## 2022-06-17 NOTE — PROGRESS NOTES
PHYSICAL THERAPY - DAILY TREATMENT NOTE    Patient Name: Mel Levin        Date: 2022  : 1975   YES Patient  Verified  Visit #:     Insurance: Payor: Vikram Kelly / Plan: [address] [place] [address] / Product Type: PPO /      In time: 1018 Out time: 1056   Total Treatment Time: 38     Medicare/[employer] Time Tracking (below)   Total Timed Codes (min):  38 1:1 Treatment Time:  38     TREATMENT AREA =  Other low back pain [M54.59]    SUBJECTIVE    Pain Level (on 0 to 10 scale):  0  / 10   Medication Changes/New allergies or changes in medical history, any new surgeries or procedures? NO    If yes, update Summary List   Subjective Functional Status/Changes:  []  No changes reported     See PN    Compliance with HEP  Yes [] No []         OBJECTIVE  Therapeutic Procedures:  Min Procedure Specifics + Rationale   38(38) [x] Therapeutic Exercise    See Flowsheet   Rationale: increase ROM and increase strength to improve the patients ability to participate in ADL's              min Patient Education:  YES Reviewed HEP         Other Objective/Functional Measures:    See flowsheet for more details    Added TB rows and pallof press      See PN     Post Treatment Pain Level (on 0 to 10) scale:   0  / 10     ASSESSMENT    Assessment/Changes in Function:     Ori Grier reports 80% improvement since start of care even though she has missed ~3 weeks of physical therapy. Lapse of care secondary to illness. She is reporting less worst pain than at evaluation. She has been compliant with HEP. Demonstrates increased eacs with sit<>stand transfers.      []  See Progress Note/Recertification   Patient will continue to benefit from skilled PT services to analyze,, cue,, progress,, modify,, demonstrate,, instruct, and address, movement patterns,, therapeutic interventions,, postural abnormalities,, soft tissue restrictions,, ROM,, strength,, functional mobility,, body mechanics/ergonomics, and home and community integration, to attain remaining goals. Progress toward goals / Updated goals:     · Short Term Goals: To be accomplished in  3  weeks:          Goal/Measure of Progress Goal Met? 1. Pt will be compliant with HEP for symptom management at home. Status at last Eval: Unable Current Status: Compliant MET   2. Patient will self report reduction of pain level at worst to 5/10 to allow for restful night's sleep. Status at last Eval: 10/10 Current Status: 6/10 PROGRESSING      · Long Term Goals: To be accomplished in  6  weeks:          Goal/Measure of Progress Goal Met? 1. Pt will be independent with HEP at D/C for self management. Status at last Eval: Unable Current Status: Ongoing PROGRESSING   2. Patient will demonstrate improved ability to transition from sit to stand at least 5x with minimal use of hands to improve transitional abilities. Status at last Eval: 1x p! Current Status: x5 without UEs MET   3. Patient will demonstrate improved hip abduction strength to at least 4/5 to allow for improved standing/walking tolerance. Status at last Eval: 3+/5 Current Status: NT NT   4. Patient will increase FOTO Functional Status score to 52 to decrease functional limitations.    Status at last Eval: 34 Current Status: NT NT              PLAN    [x]  Upgrade activities as tolerated YES Continue plan of care   []  Discharge due to :    []  Other:      Therapist: Yosvany Mccormick DPT    Date: 6/17/2022 Time: 10:20 AM     Future Appointments   Date Time Provider Alin Mcfadden   6/20/2022  3:30 PM Catarina Copes, [place] [facility]   6/24/2022 11:45 AM Art Favor [facility]   6/27/2022  3:30 PM Cristal Larios, PTA [facility]     7

## 2022-06-17 NOTE — PROGRESS NOTES
[address] [facility] PHYSICAL THERAPY  [address] [facility] Caitlin Vera [place], [address] - Phone: [phone]  Fax: [phone]  Progress Note/CONTINUED [address] for PHYSICAL THERAPY          Patient Name: Praveen Maldonado : 1975   Treatment/Medical Diagnosis: Other low back pain [M54.59]   Referral Source: [facility] 2022   Prior Hospitalization: See Medical History Provider #:  [id number]   Medications: Verified on Patient Summary List   Visits from [facility]: 6 Missed Visits: 5   GOALS  · Short Term Goals: To be accomplished in  3  weeks:          Goal/Measure of Progress Goal Met? 1. Pt will be compliant with HEP for symptom management at home. Status at last Eval: Unable Current Status: Compliant MET   2. Patient will self report reduction of pain level at worst to 5/10 to allow for restful night's sleep. Status at last Eval: 10/10 Current Status: 6/10 PROGRESSING      · Long Term Goals: To be accomplished in  6  weeks:          Goal/Measure of Progress Goal Met? 1. Pt will be independent with HEP at D/C for self management. Status at last Eval: Unable Current Status: Ongoing PROGRESSING   2. Patient will demonstrate improved ability to transition from sit to stand at least 5x with minimal use of hands to improve transitional abilities. Status at last Eval: 1x p! Current Status: x5 without UEs MET   3. Patient will demonstrate improved hip abduction strength to at least 4/5 to allow for improved standing/walking tolerance. Status at last Eval: 3+/5 Current Status: NT NT   4. Patient will increase FOTO Functional Status score to 52 to decrease functional limitations. Status at last Eval: 34 Current Status: NT NT      SUMMARY OF TREATMENT  Patient's POC has consisted of therex, therapeutic activities, manual therapy prn, modalities prn, pt. education, and a comprehensive HEP.  Treatment strategies used to address functional mobility deficits, ROM deficits, strength deficits, analyze and address soft tissue restrictions, analyze and cue movement patterns, analyze and modify body mechanics/ergonomics, assess and modify postural abnormalities and instruct in home and community integration. CURRENT STATUS  Pawan Cardona reports 80% improvement since start of care even though she has missed ~3 weeks of physical therapy. Lapse of care secondary to illness. Prior to that, she had missed several appointments for other reasons. She is reporting less worst pain than at evaluation. She has been compliant with HEP. Demonstrates increased ease with sit<>stand transfers     New Goals to be achieved in   2-4  weeks:  Goal/Measure of Progress Goal Met? 1. Patient will self report reduction of pain level at worst to 5/10 to allow for restful night's sleep. Status at last Eval: 10/10 Current Status: 6/10 PROGRESSING      Goal/Measure of Progress Goal Met? 2. Pt will be independent with HEP at D/C for self management. Status at last Eval: Unable Current Status: Ongoing PROGRESSING   3. Patient will demonstrate improved hip abduction strength to at least 4/5 to allow for improved standing/walking tolerance. Status at last Eval: 3+/5 Current Status: NT NT   4. Patient will increase FOTO Functional Status score to 52 to decrease functional limitations. Status at last Eval: 34 Current Status: NT NT     Frequency / Duration:   Patient to be seen   1-2   times per week for   2-4    weeks:    RECOMMENDATIONS: Continue and progress functional therex/therapeutic activity as able, utilizing manual therapy and modalities prn. Progress patient towards independent HEP to facilitate self-management of symptoms and progress gains after PT. If you have any questions/comments please contact us directly at [phone]. Thank you for allowing us to assist in the care of your patient.     Therapist Signature: Silvio Pemberton DPT Date: 6/17/2022 Certification Period:  Reporting Period: NA  NA Time: 10:24 AM   NOTE TO PHYSICIAN:  PLEASE COMPLETE THE ORDERS BELOW AND FAX TO   [facility] Physical Therapy: [phone]. If you are unable to process this request in 24 hours please contact our office: [phone].    ___ I have read the above report and request that my patient continue as recommended.   ___ I have read the above report and request that my patient continue therapy with the following changes/special instructions: ________________________________________________   ___ I have read the above report and request that my patient be discharged from therapy.      Physician Signature:        Date:       Time:    Naun Saleem

## 2022-06-20 ENCOUNTER — HOSPITAL ENCOUNTER (OUTPATIENT)
Dept: PHYSICAL THERAPY | Age: 47
Discharge: HOME | End: 2022-06-20
Payer: COMMERCIAL

## 2022-06-20 PROCEDURE — 97530 THERAPEUTIC ACTIVITIES: CPT

## 2022-06-20 PROCEDURE — 97110 THERAPEUTIC EXERCISES: CPT

## 2022-06-20 NOTE — PROGRESS NOTES
PHYSICAL THERAPY - DAILY TREATMENT NOTE    Patient Name: Cesia Ortiz        Date: 2022  : 1975   yes Patient  Verified  Visit #:     Insurance: Payor: Herve King / Plan: [address] [place] [address] / Product Type: PPO /      In time: 334 Out time: 433   Total Treatment Time: 59     Medicare/[facility] Time Tracking (below)   Total Timed Codes (min):   49 1:1 Treatment Time:  49     TREATMENT AREA =  Other low back pain [M54.59]    SUBJECTIVE  Pain Level (on 0 to 10 scale): 0 / 10   Medication Changes/New allergies or changes in medical history, any new surgeries or procedures?    no  If yes, update Summary List   Subjective Functional Status/Changes:  []  No changes reported     \"I was sore had some p! after last time bc it had been some time since I was here.  I had to moving furniture around in my room and I was sore from that too\"     OBJECTIVE  Modalities Rationale:     decrease pain to improve patient's ability to safely perform ADLs, bending/stooping/ lifting; perform prolong sitting/standing/ambulation; and negotiate stairs with no pain or limitations     10 min []  Ice     [x]  Heat    location/position: Supine with wedge under B LEs   [x]Skin assessment post-treatment (if applicable):   [x]  intact    []  redness- no adverse reaction                  []redness - adverse reaction:      39 min Therapeutic Exercise:  [x]  See flow sheet   Rationale:      increase ROM and increase strength to improve the patients ability to safely perform ADLs, bending/stooping/ lifting; perform prolong sitting/standing/ambulation; and negotiate stairs with no pain or limitations        10 min Therapeutic Activity: [x]  See flow sheet   Rationale:    increase ROM, increase strength and improve coordination to improve the patients ability to safely perform ADLs, bending/stooping/ lifting; perform prolong sitting/standing/ambulation; and negotiate stairs with no pain or limitations     Billed With/As: [x] TE   [x] TA   [] Neuro   [] Self Care Patient Education: [x] Review HEP    [] Progressed/Changed HEP based on:   [x] positioning   [x] body mechanics   [] transfers   [] heat/ice application    [x] other:Pt ed on importance and benefits of compliance with HEP, core strength/stability and proper posture; pt verbalized understanding      Other Objective/Functional Measures:    VCs + demo to perform proper technique and for safety with TE  added OTB to sit <>std without UE  increased Rows to GTB with no difficulty    Post Treatment Pain Level (on 0 to 10) scale:   0  / 10     ASSESSMENT  Assessment/Changes in Function:   Progressed TE without c/o increase p! []  See Progress Note/Recertification   Patient will continue to benefit from skilled PT services to modify and progress therapeutic interventions, address functional mobility deficits, address ROM deficits, address strength deficits, analyze and address soft tissue restrictions, analyze and cue movement patterns, analyze and modify body mechanics/ergonomics, assess and modify postural abnormalities and instruct in home and community integration to attain remaining goals.    Progress toward goals / Updated goals:  See PN last visit, no changes noted at this time       PLAN  [x]  Upgrade activities as tolerated yes Continue plan of care   []  Discharge due to :    []  Other:      Therapist: Codie Young PTA    Date: 6/20/2022 Time: 3:44 PM     Future Appointments   Date Time Provider Alin Mcfadden   6/24/2022 11:45 AM Griselda Lindo [facility]   6/27/2022  3:30 PM Anders Bedoya PTA [facility]

## 2022-06-24 ENCOUNTER — HOSPITAL ENCOUNTER (OUTPATIENT)
Dept: PHYSICAL THERAPY | Age: 47
Discharge: HOME | End: 2022-06-24
Payer: COMMERCIAL

## 2022-06-24 PROCEDURE — 97110 THERAPEUTIC EXERCISES: CPT

## 2022-06-24 PROCEDURE — 97530 THERAPEUTIC ACTIVITIES: CPT

## 2022-06-24 NOTE — PROGRESS NOTES
PHYSICAL THERAPY - DAILY TREATMENT NOTE    Patient Name: Vasile Torres        Date: 2022  : 1975   yes Patient  Verified  Visit #:     Insurance: Payor: Rashid Anguiano / Plan: [address] [place] [address] / Product Type: PPO /      In time: 1018 Out time: 1112   Total Treatment Time: 54     Medicare/[employer] Time Tracking (below)   Total Timed Codes (min):  44 1:1 Treatment Time:  44     TREATMENT AREA =  Other low back pain [M54.59]    SUBJECTIVE  Pain Level (on 0 to 10 scale): 0 / 10   Medication Changes/New allergies or changes in medical history, any new surgeries or procedures?    no  If yes, update Summary List   Subjective Functional Status/Changes:  []  No changes reported     \"I was hurting this morning 3/10\"     OBJECTIVE  Modalities Rationale:     decrease pain to improve patient's ability to safely perform ADLs, bending/stooping/ lifting; perform prolong sitting/standing/ambulation; and negotiate stairs with no pain or limitations     10 min []  Ice     [x]  Heat    location/position: Supine with wedge under B LEs   [x]Skin assessment post-treatment (if applicable):   [x]  intact    []  redness- no adverse reaction                  []redness - adverse reaction:       34 min Therapeutic Exercise:  [x]  See flow sheet   Rationale:      increase ROM and increase strength to improve the patients ability to safely perform ADLs, bending/stooping/ lifting; perform prolong sitting/standing/ambulation; and negotiate stairs with no pain or limitations        10 min Therapeutic Activity: [x]  See flow sheet   Rationale:    increase ROM, increase strength and improve coordination to improve the patients ability to safely perform ADLs, bending/stooping/ lifting; perform prolong sitting/standing/ambulation; and negotiate stairs with no pain or limitations     Billed With/As:   [x] TE   [x] TA   [] Neuro   [] Self Care Patient Education: [x] Review HEP    [] Progressed/Changed HEP based on:   [x] positioning   [x] body mechanics   [] transfers   [] heat/ice application    [x] other:Pt ed on importance and benefits of compliance with HEP, core strength/stability and proper posture; pt verbalized understanding      Other Objective/Functional Measures:    VCs + demo to perform proper technique and for safety with TE  demos proper pain free mini squats  initiated HRs off step, and gastroc str  note pt had LOB of bed rolling to side, with min A to recover   Post Treatment Pain Level (on 0 to 10) scale:   0  / 10     ASSESSMENT  Assessment/Changes in Function:   Progressed TE without c/o increase p!  c/o gastroc tightness     []  See Progress Note/Recertification   Patient will continue to benefit from skilled PT services to modify and progress therapeutic interventions, address functional mobility deficits, address ROM deficits, address strength deficits, analyze and address soft tissue restrictions, analyze and cue movement patterns, analyze and modify body mechanics/ergonomics, assess and modify postural abnormalities and instruct in home and community integration to attain remaining goals. Progress toward goals / Updated goals:  Goals to be achieved in   2-4  weeks:          Goal/Measure of Progress Goal Met? 1.  Patient will self report reduction of pain level at worst to 5/10 to allow for restful night's sleep.    Status at last Eval: 10/10 Current Status: 6/10 PROGRESSING              Goal/Measure of Progress Goal Met? 2.  Pt will be independent with HEP at D/C for self management. Status at last Eval: Unable Current Status: Ongoing PROGRESSING   3.  Patient will demonstrate improved hip abduction strength to at least 4/5 to allow for improved standing/walking tolerance.    Status at last Eval: 3+/5 Current Status: NT NT   4.  Patient will increase FOTO Functional Status score to 52 to decrease functional limitations.    Status at last Eval: 34 Current Status: NT NT        PLAN  [x]  Upgrade activities as tolerated yes Continue plan of care   []  Discharge due to :    []  Other:      Therapist: Pito Ramirez PTA    Date: 6/24/2022 Time: 12:19 PM     Future Appointments   Date Time Provider Alin Mcfadden   6/27/2022  3:30 PM Gil Ibrahim PTA [facility]   6/28/2022 11:45 [address] [facility]

## 2022-06-27 ENCOUNTER — HOSPITAL ENCOUNTER (OUTPATIENT)
Dept: PHYSICAL THERAPY | Age: 47
Discharge: HOME | End: 2022-06-27
Payer: COMMERCIAL

## 2022-06-27 PROCEDURE — 97530 THERAPEUTIC ACTIVITIES: CPT

## 2022-06-27 PROCEDURE — 97110 THERAPEUTIC EXERCISES: CPT

## 2022-06-27 NOTE — PROGRESS NOTES
PHYSICAL THERAPY - DAILY TREATMENT NOTE    Patient Name: Shara Vinson        Date: 2022  : 1975   yes Patient  Verified  Visit #:   10   of   12  Insurance: Payor: Selvin Howell / Plan: [address] [place] [address] / Product Type: PPO /      In time: 334 Out time: 430   Total Treatment Time: 56     Medicare/[facility] Time Tracking (below)   Total Timed Codes (min):  46 1:1 Treatment Time:  46     TREATMENT AREA =  Other low back pain [M54.59]    SUBJECTIVE  Pain Level (on 0 to 10 scale): 5/ 10   Medication Changes/New allergies or changes in medical history, any new surgeries or procedures?    no  If yes, update Summary List   Subjective Functional Status/Changes:  []  No changes reported     \"I woke up and my left rib was so tight, I think I slept funny\"     OBJECTIVE  Modalities Rationale:     decrease pain to improve patient's ability to safely perform ADLs, bending/stooping/ lifting; perform prolong sitting/standing/ambulation; and negotiate stairs with no pain or limitations     10 min []  Ice     [x]  Heat    location/position: Supine with wedge under B LEs   [x]Skin assessment post-treatment (if applicable):   [x]  intact    []  redness- no adverse reaction                  []redness - adverse reaction:      36 min Therapeutic Exercise:  [x]  See flow sheet   Rationale:      increase ROM and increase strength to improve the patients ability to safely perform ADLs, bending/stooping/ lifting; perform prolong sitting/standing/ambulation; and negotiate stairs with no pain or limitations        10 min Therapeutic Activity: [x]  See flow sheet   Rationale:    increase ROM, increase strength and improve coordination to improve the patients ability to safely perform ADLs, bending/stooping/ lifting; perform prolong sitting/standing/ambulation; and negotiate stairs with no pain or limitations     Billed With/As:   [x] TE   [x] TA   [] Neuro   [] Self Care Patient Education: [x] Review HEP    [] Progressed/Changed HEP based on:   [x] positioning   [x] body mechanics   [] transfers   [] heat/ice application    [x] other:Pt ed on importance and benefits of compliance with HEP, core strength/stability and proper posture; pt verbalized understanding      Other Objective/Functional Measures:    VCs + demo to perform proper technique and for safety with TE  added 5# to mini squats   open book reduced to rib pain  demos proper, p! free mini squats with 5#    Post Treatment Pain Level (on 0 to 10) scale:  2 / 10     ASSESSMENT  Assessment/Changes in Function:   increased left rib p! with bed mobility  demos proper bed mobility with instruction     []  See Progress Note/Recertification   Patient will continue to benefit from skilled PT services to modify and progress therapeutic interventions, address functional mobility deficits, address ROM deficits, address strength deficits, analyze and address soft tissue restrictions, analyze and cue movement patterns, analyze and modify body mechanics/ergonomics, assess and modify postural abnormalities and instruct in home and community integration to attain remaining goals. Progress toward goals / Updated goals:  Goals to be achieved in   2-4  weeks:          Goal/Measure of Progress Goal Met? 1.  Patient will self report reduction of pain level at worst to 5/10 to allow for restful night's sleep.    Status at last Eval: 10/10 Current Status: 6/10 PROGRESSING              Goal/Measure of Progress Goal Met? 2.  Pt will be independent with HEP at D/C for self management. Status at last Eval: Unable Current Status: Ongoing PROGRESSING   3.  Patient will demonstrate improved hip abduction strength to at least 4/5 to allow for improved standing/walking tolerance.    Status at last Eval: 3+/5 Current Status: NT NT   4.  Patient will increase FOTO Functional Status score to 52 to decrease functional limitations.    Status at last Eval: 34 Current Status: NT NT PLAN  [x]  Upgrade activities as tolerated yes Continue plan of care   []  Discharge due to :    []  Other:      Therapist: Fabricio Barton PTA    Date: 6/27/2022 Time: 12:19 PM     Future Appointments   Date Time Provider Alin Mcfadden   6/28/2022 11:45 AM [address] [facility]   7/5/2022  4:15 PM Adan Cali, PT [facility]   7/6/2022 10:15 AM Adan Cali, PT [facility]   7/11/2022 10:15 AM Adan Cali, PT [facility]   7/13/2022 10:15 AM Adan Cali PT [facility]   7/18/2022  9:30 AM Albert Jones PTA [facility]   7/20/2022  9:30 AM Gaye Ness [facility]   7/25/2022 10:15 AM Adan Cali PT [facility]   7/27/2022 10:15 AM Adan Cali, PT [facility]

## 2022-06-28 ENCOUNTER — HOSPITAL ENCOUNTER (OUTPATIENT)
Dept: PHYSICAL THERAPY | Age: 47
Discharge: HOME | End: 2022-06-28
Payer: COMMERCIAL

## 2022-06-28 PROCEDURE — 97110 THERAPEUTIC EXERCISES: CPT

## 2022-06-28 PROCEDURE — 97530 THERAPEUTIC ACTIVITIES: CPT

## 2022-06-28 NOTE — PROGRESS NOTES
PHYSICAL THERAPY - DAILY TREATMENT NOTE    Patient Name: Amy Avila        Date: 2022  : 1975   yes Patient  Verified  Visit #:     Insurance: Payor: Ainsley Martinez / Plan: [address] [place] [address] / Product Type: PPO /      In time: 1155 Out time: 1247   Total Treatment Time: 52     Medicare/[employer] Time Tracking (below)   Total Timed Codes (min):  42 1:1 Treatment Time:  42     TREATMENT AREA =  Other low back pain [M54.59]    SUBJECTIVE  Pain Level (on 0 to 10 scale): 3 10   Medication Changes/New allergies or changes in medical history, any new surgeries or procedures?    no  If yes, update Summary List   Subjective Functional Status/Changes:  []  No changes reported        OBJECTIVE    10 min Modalities: [x]  Ice     []  Heat    Location/position: supine with wedge under B LEs    [x]Skin assessment post-treatment (if applicable):   [x]  intact    []  redness- no adverse reaction                  []redness - adverse reaction:      Modalities Rationale:  10\" CP to lumbar spine with pt is supine,   decrease pain to improve patient's ability to safely perform ADLs, bending/stooping/ lifting; perform prolong sitting/standing/ambulation; and negotiate stairs with no pain or limitations   25 min Therapeutic Exercise:  [x]  See flow sheet   Rationale:      increase ROM and increase strength to improve the patients ability to safely perform ADLs, bending/stooping/ lifting; perform prolong sitting/standing/ambulation; and negotiate stairs with no pain or limitations        17 min Therapeutic Activity: [x]  See flow sheet   Rationale:    increase ROM, increase strength and improve coordination to improve the patients ability to safely perform ADLs, bending/stooping/ lifting; perform prolong sitting/standing/ambulation; and negotiate stairs with no pain or limitations     Billed With/As:   [x] TE   [x] TA   [] Neuro   [] Self Care Patient Education: [x] Review HEP    [] Progressed/Changed HEP based on:   [x] positioning   [x] body mechanics   [] transfers   [] heat/ice application    [x] other:Pt ed on importance and benefits of compliance with HEP. Other Objective/Functional Measures:      Post Treatment Pain Level (on 0 to 10) scale:  2 / 10     ASSESSMENT  Assessment/Changes in Function:    Pt tolerates session well with no c/o pain increase, stating decrease in pain at end of session. []  See Progress Note/Recertification   Patient will continue to benefit from skilled PT services to modify and progress therapeutic interventions, address functional mobility deficits, address ROM deficits, address strength deficits, analyze and address soft tissue restrictions, analyze and cue movement patterns, analyze and modify body mechanics/ergonomics, assess and modify postural abnormalities and instruct in home and community integration to attain remaining goals. Progress toward goals / Updated goals:  Goals to be achieved in   2-4  weeks:          Goal/Measure of Progress Goal Met? 1.  Patient will self report reduction of pain level at worst to 5/10 to allow for restful night's sleep.    Status at last Eval: 10/10 Current Status: 6/10 PROGRESSING              Goal/Measure of Progress Goal Met? 2.  Pt will be independent with HEP at D/C for self management. Status at last Eval: Unable Current Status: Ongoing PROGRESSING   3.  Patient will demonstrate improved hip abduction strength to at least 4/5 to allow for improved standing/walking tolerance.    Status at last Eval: 3+/5 Current Status: NT NT   4.  Patient will increase FOTO Functional Status score to 52 to decrease functional limitations.    Status at last Eval: 34 Current Status: NT NT        PLAN  [x]  Upgrade activities as tolerated yes Continue plan of care   []  Discharge due to :    []  Other:      Therapist: Jesica Alarcon PTA    Date: 6/28/2022 Time: 4:25PM     Future Appointments   Date Time Provider Alin Mcfadden   6/28/2022 11:45 AM [facility] [place] PT [address] [facility]   7/5/2022  4:15 PM Nira Denver, PT [facility]   7/6/2022 10:15 AM Nira Denver, PT [facility]   7/11/2022 10:15 AM Nira Denver, PT [facility]   7/13/2022 10:15 AM Nira Denver, PT [facility]   7/18/2022  9:30 AM El Braga PTA [facility]   7/20/2022  9:30 AM Rukhsana Calvillo [facility]   7/25/2022 10:15 AM Nira Denver, PT [facility] [place]   7/27/2022 10:15 AM Nira Denver, PT [facility]

## 2022-07-05 ENCOUNTER — HOSPITAL ENCOUNTER (OUTPATIENT)
Dept: PHYSICAL THERAPY | Age: 47
Discharge: HOME | End: 2022-07-05
Payer: COMMERCIAL

## 2022-07-05 PROCEDURE — 97110 THERAPEUTIC EXERCISES: CPT

## 2022-07-05 PROCEDURE — 97530 THERAPEUTIC ACTIVITIES: CPT

## 2022-07-05 NOTE — PROGRESS NOTES
PHYSICAL THERAPY - DAILY TREATMENT NOTE    Patient Name: Angelica Keen        Date: 2022  : 1975   YES Patient  Verified  Visit #:     Insurance: Payor: Toño Pappas / Plan: [address] [place] [address] / Product Type: PPO /      In time: 4:15 Out time: 5:05   Total Treatment Time: 50     TREATMENT AREA = Other low back pain [M54.59]    SUBJECTIVE    Pain Level (on 0 to 10 scale):  5  / 10   Medication Changes/New allergies or changes in medical history, any new surgeries or procedures? NO    If yes, update Summary List   Subjective Functional Status/Changes:  []  No changes reported     Pt reports recent exacerbation of LBP after carrying a baby up [facility] yesterday. OBJECTIVE    Therapeutic Procedures:  Min Procedure Specifics + Rationale   n/a [x]  Patient Education (performed throughout session) [x] Review HEP    [] Progressed/Changed HEP based on:   [] proper performance and advancement of Therex/TA   [] reduction in pain level    [] increased functional capacity       [] change in directional preference   30 [x] Therapeutic Exercise   [x]  See Flowsheet   Rationale: increase ROM and increase strength to improve the patients ability to participate in ADL's    10 [x] Therapeutic Activity   [x]  See Flowsheet    Rationale:  To improve safety, proprioception, coordination, and efficiency with tasks     Modality rationale: decrease inflammation, decrease pain, increase tissue extensibility and increase muscle contraction/control to improve the patients ability to perform ADL's with greater ease     Min Type Additional Details   10 [x]  Cold Pack   [] pre-LEXA      [x] post-LEXA  []  Ice massage Location: L/S    [x] supine             [] prone  [x] legs elevated  [] legs flat  [] sitting              [] sidelying - [] left [] right   [x] Skin assessment post-treatment:  [x]intact [x]redness- no adverse reaction       []redness - adverse reaction:     Other Objective/Functional Measures:    See flow sheet for more details     Required min VCs and demo throughout session for proper form and safety     Increased to BTB with rows/ext without difficulty     Deferred bridges due to increased LBP      Post Treatment Pain Level (on 0 to 10) scale:   0  / 10     ASSESSMENT    Assessment/Changes in Function:     Pt tolerated session well without increase in pain. Demonstrates proper form and mechanics with squats. Patient will continue to benefit from skilled PT services to modify and progress therapeutic interventions, address functional mobility deficits, address ROM deficits, address strength deficits, analyze and address soft tissue restrictions, analyze and cue movement patterns, analyze and modify body mechanics/ergonomics, assess and modify postural abnormalities and instruct in home and community integration  to attain remaining goals   Progress toward goals / Updated goals:    Pt demonstrates improvement in strength despite recent exacerbation of LBP. Addressed hip abduction strength with standing hip 3 way and mini squats with RTB. PLAN    [x]  Upgrade activities as tolerated  [x]  Update interventions per flow sheet YES Continue plan of care   []  Discharge due to :    []  Other:      Therapist: Ronn Cole, SPT  Morales \"BJ\" Dylan Miller, DPT, Cert. MDT, Cert. DN, Cert. SMT, Dip.  [employer]ste[employer]    Date: 7/5/2022 Time: 4:19 PM     Future Appointments   Date Time Provider Alin Mcfadden   7/6/2022 10:15 AM Nira Denver, PT [facility]   7/11/2022 10:15 AM Nira Denver, PT [facility]   7/13/2022 10:15 AM Nira Denver, PT [facility]   7/18/2022  9:30 AM El Braga PTA [facility]   7/20/2022  9:30 AM Rukhsana Calvillo [facility]   7/25/2022 10:15 AM Nira Denver, PT [facility]   7/27/2022 10:15 AM [facility]

## 2022-07-06 ENCOUNTER — HOSPITAL ENCOUNTER (OUTPATIENT)
Dept: PHYSICAL THERAPY | Age: 47
Discharge: HOME | End: 2022-07-06
Payer: COMMERCIAL

## 2022-07-06 PROCEDURE — 97530 THERAPEUTIC ACTIVITIES: CPT

## 2022-07-06 PROCEDURE — 97110 THERAPEUTIC EXERCISES: CPT

## 2022-07-06 NOTE — PROGRESS NOTES
PHYSICAL THERAPY - DAILY TREATMENT NOTE    Patient Name: María Bang        Date: 2022  : 1975   YES Patient  Verified  Visit #:   15   of   14  Insurance: Payor: Srinivas Piper / Plan: [address] [place] [address] / Product Type: PPO /      In time: 10:15 Out time: 11:12   Total Treatment Time: 57     TREATMENT AREA = Other low back pain [M54.59]    SUBJECTIVE    Pain Level (on 0 to 10 scale):  5-6   10   Medication Changes/New allergies or changes in medical history, any new surgeries or procedures? NO    If yes, update Summary List   Subjective Functional Status/Changes:  []  No changes reported     \"I'm not awake yet. I'm not sure what I did, I guess I slept hard. But I'm in a lot of pain. I had a hard time getting my pants and shoes and socks on this morning. \"          OBJECTIVE    Therapeutic Procedures:  Min Procedure Specifics + Rationale   n/a [x]  Patient Education (performed throughout session) [x] Review HEP    [] Progressed/Changed HEP based on:   [] proper performance and advancement of Therex/TA   [] reduction in pain level    [] increased functional capacity       [] change in directional preference   30 [x] Therapeutic Exercise   [x]  See Flowsheet   Rationale: increase ROM and increase strength to improve the patients ability to participate in ADL's    17 [x] Therapeutic Activity   [x]  See Flowsheet    Rationale:  To improve safety, proprioception, coordination, and efficiency with tasks     Modality rationale: decrease inflammation, decrease pain, increase tissue extensibility and increase muscle contraction/control to improve the patients ability to perform ADL's with greater ease     Min Type Additional Details   10 [x]  Cold Pack   [] pre-LEXA      [x] post-LEXA  []  Ice massage Location: L/S    [x] supine             [] prone  [x] legs elevated  [] legs flat  [] sitting              [] sidelying - [] left [] right   [x] Skin assessment post-treatment:  [x]intact [x]redness- 97.9 no adverse reaction       []redness - adverse reaction:     Other Objective/Functional Measures:    See flow sheet for more details. Pt required VCs and demo throughout session for proper form and safety     Added standing hip flexion to address pt's concerns with donning/doffing. Post Treatment Pain Level (on 0 to 10) scale:   2  / 10     ASSESSMENT    Assessment/Changes in Function:     Pt tolerated session well without any increase in pain. Pt able to tolerate bridges today with minimal pain. Patient will continue to benefit from skilled PT services to modify and progress therapeutic interventions, address functional mobility deficits, address ROM deficits, address strength deficits, analyze and address soft tissue restrictions, analyze and cue movement patterns, analyze and modify body mechanics/ergonomics, assess and modify postural abnormalities and instruct in home and community integration  to attain remaining goals   Progress toward goals / Updated goals:    Pt shows improvement in hip strength. Would benefit from continued hip flexion and IR/ER strength to ease donning/doffing. PLAN    [x]  Upgrade activities as tolerated  [x]  Update interventions per flow sheet YES Continue plan of care   []  Discharge due to :    []  Other:      Therapist: Violetta Clinton, SPT  Morales \"BJ\" Elizabeth Montoya, DPT, Cert. MDT, Cert. DN, Cert. SMT, Dip.  Osteopractic    Date: 7/6/2022 Time: 10:18 AM     Future Appointments   Date Time Provider Alin Mcfadden   7/11/2022 10:15 AM Portillo Franklin PT [facility]   7/13/2022 10:15 AM Portillo Franklin PT [facility]   7/18/2022  9:30 AM Henny Dominguez PTA [facility]   7/20/2022  9:30 AM Akin Small [facility]   7/25/2022 10:15 AM Portillo Franklin PT [facility]   7/27/2022 10:15 AM ESTEPHANIA LinPTCINDY [facility]

## 2022-07-11 ENCOUNTER — HOSPITAL ENCOUNTER (OUTPATIENT)
Dept: PHYSICAL THERAPY | Age: 47
Discharge: HOME | End: 2022-07-11
Payer: COMMERCIAL

## 2022-07-11 PROCEDURE — 97110 THERAPEUTIC EXERCISES: CPT

## 2022-07-11 PROCEDURE — 97530 THERAPEUTIC ACTIVITIES: CPT

## 2022-07-11 NOTE — PROGRESS NOTES
[facility] PHYSICAL THERAPY  [address] [place] [address] [place], [address] - Phone: [phone]  Fax: [phone] SUMMARY  Patient Name: Kiki Gaines : 1975   Treatment/Medical Diagnosis: Other low back pain [M54.59]   Referral Source: Dyan Matos [place]     Date of Initial Visit: 2022 Attended Visits: 14 Missed Visits: 5     SUMMARY OF TREATMENT  Patient's POC has consisted of therex, therapeutic activities, manual therapy prn, modalities prn, pt. education, and a comprehensive HEP. Treatment strategies used to address functional mobility deficits, ROM deficits, strength deficits, analyze and address soft tissue restrictions, analyze and cue movement patterns, analyze and modify body mechanics/ergonomics, assess and modify postural abnormalities and instruct in home and community integration. CURRENT STATUS  Patient made great strides in improving her pain and functionality since last PN. She is now working >40 hours/week (60+) and is able to alleviate and abolish symptom recurrence with her HEP. Patient agreed to DC at this time due to the limit on the # of visits allowed by her insurance/year and her improved status and independence with ADL's. GOALS/MEASURE OF PROGRESS Goal Met? Patient will self report reduction of pain level at worst to 5/10 to allow for restful night's sleep.   2. Patient to be Independent with HEP to self-manage/prevent symptoms after DC. 3. Patient will demonstrate improved hip abduction strength to at least 4/5 to allow for improved standing/walking tolerance.   4.Patient will increase FOTO Functional Status score to 52 to decrease functional limitations. MET      MET      MET    MET     RECOMMENDATIONS  Discontinue therapy. Progressing towards or have reached established goals. If you have any questions/comments please contact us directly at [phone].    Thank you for allowing us to assist in the care of your patient. Therapist Signature: Vince Ballesteros \"BJ\" Sandra Quintana, DPT, Cert. MDT, Cert. DN, Cert. SMT, Dip. Osteopractic Date: 7/11/22   Reporting Period: N/a     Certification Period n/a Time: 10:28 AM     NOTE TO PHYSICIAN:  PLEASE COMPLETE THE ORDERS BELOW AND FAX TO   [facility]: [phone]  If you are unable to process this request in 24 hours please contact our office: [phone]    ___ I have read the above report and request that my patient continue as recommended.   ___ I have read the above report and request that my patient continue therapy with the following changes/special instructions:_________________________________________________________   ___ I have read the above report and request that my patient be discharged from therapy.      Physician Signature:        Date:     Time:

## 2022-07-11 NOTE — PROGRESS NOTES
PHYSICAL THERAPY - DAILY TREATMENT NOTE    Patient Name: Kiki Gaines        Date: 2022  : 1975   YES Patient  Verified  Visit #:   15   of   14+  Insurance: Payor: Shilpi Benjamin / Plan: [address] [place] [address] / Product Type: PPO /       In time: 10:05 Out time: 11:08   Total Treatment Time: 63     Medicare/BCBS Time Tracking (below)   Total Timed Codes (min):  63 1:1 Treatment Time:  53     TREATMENT AREA = Other low back pain [M54.59]    SUBJECTIVE    Pain Level (on 0 to 10 scale):  0  / 10   Medication Changes/New allergies or changes in medical history, any new surgeries or procedures? NO    If yes, update Summary List   Subjective Functional Status/Changes:  []  No changes reported     \"I've been good. They have me working 60 hours this week. \"          OBJECTIVE     Therapeutic Procedures:  Min Procedure Specifics + Rationale   n/a [x]  Patient Education (performed throughout session) [x] Review HEP    [] Progressed/Changed HEP based on:   [] proper performance and advancement of Therex/TA   [] reduction in pain level    [] increased functional capacity       [] change in directional preference   38 [x] Therapeutic Exercise   [x]  See Flowsheet   Rationale: increase ROM and increase strength to improve the patients ability to participate in ADL's    15 [x] Therapeutic Activity   [x]  See Flowsheet  Re-assessment  Rationale:  To improve safety, proprioception, coordination, and efficiency with tasks     Modality rationale: decrease inflammation, decrease pain, increase tissue extensibility and increase muscle contraction/control to improve the patients ability to perform ADL's with greater ease     Min Type Additional Details   10 [x]  Cold Pack   [] pre-LEXA      [x] post-LEXA  []  Ice massage Location: L/S    [] supine             [] prone  [] legs elevated  [] legs flat  [] sitting              [] sidelying - [] left [] right   [x] Skin assessment post-treatment:  [x]intact [x]redness- no adverse reaction       []redness - adverse reaction:     Other Objective/Functional Measures:    See DC     Post Treatment Pain Level (on 0 to 10) scale:   0  / 10     ASSESSMENT    Assessment/Changes in Function:       See DC         Patient will continue to benefit from skilled PT services to n/a  to attain remaining goals   Progress toward goals / Updated goals:    See DC     PLAN    [x]  Upgrade activities as tolerated  [x]  Update interventions per flow sheet NO Continue plan of care   []  Discharge due to :    []  Other:      Therapist: Darby Sotelo \"BJ\" Jose, DPT, Cert. MDT, Cert. DN, Cert. SMT, Dip.  Osteopractic    Date: 7/11/2022 Time: 10:21 AM     Future Appointments   Date Time Provider Alin Mcfadden   7/13/2022 10:15 AM Lucia Ortiz, PT [facility]   7/18/2022  9:30 AM Reshma Wilkerson PTA [facility]   7/20/2022  9:30 AM Abelino Fernández [facility]   7/25/2022 10:15 AM Lucia Ortiz PT [facility]   7/27/2022 10:15 AM Lucia Ortiz PT [facility]

## 2022-07-13 ENCOUNTER — APPOINTMENT (OUTPATIENT)
Dept: PHYSICAL THERAPY | Age: 47
End: 2022-07-13
Payer: COMMERCIAL

## 2022-07-18 ENCOUNTER — APPOINTMENT (OUTPATIENT)
Dept: PHYSICAL THERAPY | Age: 47
End: 2022-07-18
Payer: COMMERCIAL

## 2022-07-20 ENCOUNTER — APPOINTMENT (OUTPATIENT)
Dept: PHYSICAL THERAPY | Age: 47
End: 2022-07-20
Payer: COMMERCIAL

## 2022-07-25 ENCOUNTER — APPOINTMENT (OUTPATIENT)
Dept: PHYSICAL THERAPY | Age: 47
End: 2022-07-25
Payer: COMMERCIAL

## 2022-07-27 ENCOUNTER — APPOINTMENT (OUTPATIENT)
Dept: PHYSICAL THERAPY | Age: 47
End: 2022-07-27
Payer: COMMERCIAL

## 2025-03-24 ENCOUNTER — HOSPITAL ENCOUNTER (OUTPATIENT)
Facility: HOSPITAL | Age: 50
Setting detail: RECURRING SERIES
Discharge: HOME | End: 2025-03-27
Payer: COMMERCIAL

## 2025-03-24 PROCEDURE — 97161 PT EVAL LOW COMPLEX 20 MIN: CPT

## 2025-03-24 PROCEDURE — 97535 SELF CARE MNGMENT TRAINING: CPT

## 2025-03-24 PROCEDURE — 97110 THERAPEUTIC EXERCISES: CPT

## 2025-03-24 NOTE — PROGRESS NOTES
Physical Therapy Evaluation       Patient Name: Cheryl Leonard    Date: 3/24/2025    : 1975  Insurance: Payor: YARELIS / [facility] / Product Type: *No Product type* /      Patient  verified yes     Visit #   Current / Total 1 12   Time   In / Out 9:30 10   Pain   In / Out 2 2   Subjective Functional Status/Changes: See below     TREATMENT AREA =  Dysfunctional voiding of urine [N39.8]      SUBJECTIVE    Current symptoms/Complaints: Pt is a 49 year old female who presents to physical therapy with complaints of bladder discomfort and nocturnal enuresis. It started 4 months ago. She reports having bladder infections back to back. She just had a cystoscopy as well, WNL. Sometimes she has to squeeze to get it out and her bladder will feel heavy. Pt also has urge urinary incontinence. She sometimes leaks while coughing or sneezing. When she has to go to the bathroom and she coughs she will leak.     Pain Location: bladder and vagina   Pain Level (0-10 scale): 2  []constant []intermittent []improving []worsening [x]no change since onset  Pain Quality: achy    Aggravating Factors: having to go to the bathroom, sexual intercourse, lying down, sitting up, standing  Alleviating Factors: antibiotics, going to the bathroom     PMHx/Surgical Hx: obesity, asthma, type 2 DM, sleep apnea (uses CPAP machine), hyperthyroidism, depression, HTN, athritis mostly in back, sciatica   Birthing Hx: none    Occupation/Work Hx: standing all day, 8 hour shifts   Exercise/Hobbies: walk  Pt Goals: \"I want to be able to hold my bladder more\"    Urinary Symptoms:     Current urinary complaint  Mixed urinary incontinence, difficulty eliminating urine     Bladder complaint longevity:  4 months    Bladder symptom progression:  Staying same    Frequency of UI: 1-2 times per day    Pad use:  Denies, except depends to bed     Pad wetness when changed:  Couple of drops     Daytime urinary frequency:  Every 2-3 hour(s) during the

## 2025-03-24 NOTE — PROGRESS NOTES
[facility]  [address], [facility] [address]. [place] [address]  Ph [phone]   Fx [phone]    Plan of Care/ Statement of Necessity for Physical Therapy Services    Patient Name: Cheryl Leonard : 1975   Medical   Diagnosis: Dysfunctional voiding of urine Treatment Diagnosis: N39.46  MIXED INCONTINENCE      Onset Date: 2024 Payor :  Payor: LUCINAARA / [facility] / Product Type: *No Product type* /    Referral Source: Maranda Randhawa FNP Start of Care (SOC): 3/24/2025   Prior Hospitalization: See medical history Provider #: [id number]   Prior Level of Function: No incontinence, urgency, difficulty voiding or pain   Comorbidities:  obesity, asthma, type 2 DM, sleep apnea (uses CPAP machine), hyperthyroidism, depression, HTN, athritis mostly in back, sciatica               The Plan of Care and following information is based on the information from the initial evaluation.  Assessment/ key information: Patient is a 49 year old female presenting to Physical Therapy with c/o mixed urinary incontinence, urinary urgency and difficulty fully eliminating urine which is limiting ability function at previous level. Patient has bladder pain complaints with lying down, sitting and standing, as well as while having to go to the bathroom. Patient presents with poor DPB in sitting and likely tight and weak pelvic floor. Patient presents with fair understanding of bladder anatomy/function, dietary irritants, and urge suppression. I feel patient would benefit from skilled therapeutic intervention to optimize highest functional level possible.      Patient will benefit from skilled PT services to modify and progress therapeutic interventions, address functional mobility deficits, address ROM deficits, address strength deficits, analyze and address soft tissue restrictions, analyze and cue movement patterns, analyze and modify body mechanics/ergonomics, assess and modify postural

## 2025-04-02 ENCOUNTER — HOSPITAL ENCOUNTER (OUTPATIENT)
Facility: HOSPITAL | Age: 50
Setting detail: RECURRING SERIES
Discharge: HOME | End: 2025-04-05
Payer: COMMERCIAL

## 2025-04-02 PROCEDURE — 97110 THERAPEUTIC EXERCISES: CPT

## 2025-04-02 PROCEDURE — 97530 THERAPEUTIC ACTIVITIES: CPT

## 2025-04-02 PROCEDURE — 97535 SELF CARE MNGMENT TRAINING: CPT

## 2025-04-02 PROCEDURE — 97140 MANUAL THERAPY 1/> REGIONS: CPT

## 2025-04-02 NOTE — PROGRESS NOTES
PHYSICAL / OCCUPATIONAL THERAPY - DAILY TREATMENT NOTE (updated )    Patient Name: Cheryl Leonard    Date: 2025    : 1975  Insurance: Payor: YARELIS / Plan: [employer] [place] HMO / Product Type: *No Product type* /      Patient  verified Yes     Visit #   Current / Total 2 12   Time   In / Out 9:20 10   Pain   In / Out 0 0   Subjective Functional Status/Changes: Pt reports feeling a little better. She's been laying off the mountain dew.      TREATMENT AREA =  Dysfunctional voiding of urine    OBJECTIVE         Therapeutic Procedures:    Tx Min Billable or 1:1 Min (if diff from Tx Min) Procedure, Rationale, Specifics   15  47182 Therapeutic Activity (timed):  use of dynamic activities replicating functional movements to increase ROM, strength, coordination, balance, and proprioception in order to improve patient's ability to progress to PLOF and address remaining functional goals.  (see flow sheet as applicable)     Details if applicable:  pelvic floor muscle exam and hip flexibility testing     9  35976 Therapeutic Exercise (timed):  increase ROM, strength, coordination, balance, and proprioception to improve patient's ability to progress to PLOF and address remaining functional goals. (see flow sheet as applicable)     Details if applicable:     8  50044 Self Care/Home Management (timed):  improve patient knowledge and understanding of see below  to improve patient's ability to progress to PLOF and address remaining functional goals.  (see flow sheet as applicable)     Details if applicable:  pt edu on self pelvic floor release with pelvic wand and benefit from aerobic exercise (advised to walk 15-30 minutes 3-5 days per week)   8  58022 Manual Therapy (timed):  decrease pain, increase ROM, increase tissue extensibility, and decrease trigger points to improve patient's ability to progress to PLOF and address remaining functional goals.  The manual therapy interventions were performed at a separate

## 2025-04-09 ENCOUNTER — HOSPITAL ENCOUNTER (OUTPATIENT)
Facility: HOSPITAL | Age: 50
Setting detail: RECURRING SERIES
Discharge: HOME | End: 2025-04-12
Payer: COMMERCIAL

## 2025-04-09 PROCEDURE — 97530 THERAPEUTIC ACTIVITIES: CPT

## 2025-04-09 PROCEDURE — 97110 THERAPEUTIC EXERCISES: CPT

## 2025-04-09 PROCEDURE — 97112 NEUROMUSCULAR REEDUCATION: CPT

## 2025-04-09 NOTE — PROGRESS NOTES
urgency incontinence 1-2 times  per day and 3-4 times at night  Current: less leaking but still occurs 1-2 times per day, only gets up to go to the bathroom 1-2 times at night (no leaking) GOAL PARTIALLY MET     Patient will demonstrate improvement of current complaints evidenced by a 20 point  improvement in NIKA, urogenital distress inventory in order to decrease urinary symptoms  Status at Eval: NIKA 75  Current: 21 GOAL MET     Patient will demonstrate independence with management tools and exercise program that are beneficial for current condition in order to feel comfortable with Pelvic floor PT D/C and not fear exacerbation of current condition or symptoms returning.   Status at eval : patient fearful of return to exercise and unaware of what activities to avoid to avoid exacerbation of current condition  Current: pt feels like she is between 50 and 75% of where she wants to be, feels ready to be D/Rick GOAL MET    Next PN/ RC due 4/24/25  Auth due 30v EOY    PLAN  No  Continue plan of care  []  Upgrade activities as tolerated  [x]  Discharge due to : having met most goals  []  Other:    Yanci Treadwell, PT    4/9/2025    9:22 AM    Future Appointments   Date Time Provider Department Center   7/9/2025  9:30 AM [facility] Sched   7/16/2025  9:15 AM Silviano El MD [facility] Sched       If an interpreting service was utilized for treatment of this patient, the contents of this document represent the material reviewed with the patient via the .

## 2025-04-09 NOTE — THERAPY DISCHARGE
[facility]  [address] [place] [address]  Phone: [phone] Fax [phone]  DISCHARGE SUMMARY  Patient Name: Cheryl Leonard : 1975   Treatment/Medical Diagnosis: Dysfunctional voiding of urine [N39.8]  Mixed incontinence [N39.46]   Referral Source: Maranda Randhawa FNP     Date of Initial Visit: 3/24/25 Attended Visits: 3 Missed Visits: 0       SUMMARY OF TREATMENT  Patient's POC has consisted of therex, therapeutic activities, manual therapy prn, modalities prn, pt. education, and a comprehensive HEP. Treatment strategies used to address functional mobility deficits, ROM deficits, strength deficits, analyze and address soft tissue restrictions, analyze and cue movement patterns, analyze and modify body mechanics/ergonomics, assess and modify postural abnormalities and instruct in home and community integration.      CURRENT STATUS  Short term goals: To be achieved in 2 weeks:  Patient will demonstrate proper dietary/fluid habits and urge suppression strategies that promote bladder and bowel health to aid in management of urinary urgency and incontinence.  Status at eval: Patient consuming 64 oz of water, 8 oz of coffee and 24 oz of mountain dew and unaware of bladder fitness, dietary irritants and urge suppression strategies.   Current: pt aware of dietary irritants, urge suppression strategies and bladder fitness; she has cut back on mountain dew and is drinking adequate amount of water GOAL MET     Long term goals: To be achieved in 4 weeks:  Patient will decrease incontinence episodes from 1-2 times a day to 0-1 and from 3-4 times at night to 2-3 in order to help with urinary symptoms.   Status at eval: patient stress and urgency incontinence 1-2 times  per day and 3-4 times at night  Current: less leaking but still occurs 1-2 times per day, only gets up to go to the bathroom 1-2 times at night (no leaking) GOAL PARTIALLY MET

## (undated) DEVICE — REM POLYHESIVE ADULT PATIENT RETURN ELECTRODE: Brand: VALLEYLAB

## (undated) DEVICE — SKIN MARKER,REGULAR TIP WITH RULER AND LABELS: Brand: DEVON

## (undated) DEVICE — 3M™ TEGADERM™ TRANSPARENT FILM DRESSING FRAME STYLE, 1626W, 4 IN X 4-3/4 IN (10 CM X 12 CM), 50/CT 4CT/CASE: Brand: 3M™ TEGADERM™

## (undated) DEVICE — SUTURE VCRL SZ 3-0 L27IN ABSRB UD L26MM SH 1/2 CIR J416H

## (undated) DEVICE — (D)PREP SKN CHLRAPRP APPL 26ML -- CONVERT TO ITEM 371833

## (undated) DEVICE — BANDAGE COMPR W4INXL5YD BGE COHESIVE SELF ADH ADBAN CBN1104] AVCOR HEALTHCARE PRODUCTS INC]

## (undated) DEVICE — SPONGE: SPECIALTY CHERRY DISS XR 100/CS: Brand: MEDICAL ACTION INDUSTRIES

## (undated) DEVICE — KENDALL SCD EXPRESS SLEEVES, KNEE LENGTH, MEDIUM: Brand: KENDALL SCD

## (undated) DEVICE — SYR 10ML CTRL LR LCK NSAF LF --

## (undated) DEVICE — ZINACTIVE USE 2641837 CLIP LIG M BLU TI HRT SHP WIRE HORZ 600 PER BX

## (undated) DEVICE — SUT ETHLN 3-0 18IN PS1 BLK --

## (undated) DEVICE — STERILE POLYISOPRENE POWDER-FREE SURGICAL GLOVES: Brand: PROTEXIS

## (undated) DEVICE — PAD,ABDOMINAL,5"X9",STERILE,LF,1/PK: Brand: MEDLINE INDUSTRIES, INC.

## (undated) DEVICE — CLIP INT SM WIDE RED TI TRNSVRS GRV CHEVRON SHP W PRECIS

## (undated) DEVICE — TELFA NON-ADHERENT ABSORBENT DRESSING: Brand: TELFA

## (undated) DEVICE — SUTURE VCRL SZ 3-0 L18IN ABSRB UD POLYGLACTIN 910 BRAID TIE J910T

## (undated) DEVICE — OCCLUSIVE GAUZE STRIP,3% BISMUTH TRIBROMOPHENATE IN PETROLATUM BLEND: Brand: XEROFORM

## (undated) DEVICE — CONVERTORS STOCKINETTE: Brand: CONVERTORS

## (undated) DEVICE — PAD,NON-ADHERENT,3X8,STERILE,LF,1/PK: Brand: MEDLINE

## (undated) DEVICE — INTENDED FOR TISSUE SEPARATION, AND OTHER PROCEDURES THAT REQUIRE A SHARP SURGICAL BLADE TO PUNCTURE OR CUT.: Brand: BARD-PARKER ® STAINLESS STEEL BLADES

## (undated) DEVICE — (D)STRIP SKN CLSR 0.5X4IN WHT --

## (undated) DEVICE — SUTURE PROL SZ 4-0 L18IN NONABSORBABLE BLU L19MM PC-5 3/8 8631G

## (undated) DEVICE — SUTURE ETHLN SZ 3-0 L30IN NONABSORBABLE BLK L30MM PSLX 3/8 1691H

## (undated) DEVICE — STERILE LATEX POWDER-FREE SURGICAL GLOVESWITH NITRILE COATING: Brand: PROTEXIS

## (undated) DEVICE — SOLUTION IV 1000ML 0.9% SOD CHL

## (undated) DEVICE — INSULATED BLADE ELECTRODE: Brand: EDGE

## (undated) DEVICE — STERILE POLYISOPRENE POWDER-FREE SURGICAL GLOVES WITH EMOLLIENT COATING: Brand: PROTEXIS

## (undated) DEVICE — Z DISCONTINUED USE 2219801 STAPLER SKIN REG CRWN L5.7MM LEG L3.9MM WIRE DIA0.53MM PROX

## (undated) DEVICE — DEPAUL MAJOR PROCEDURE PACK: Brand: MEDLINE INDUSTRIES, INC.

## (undated) DEVICE — GAUZE SPONGES,12 PLY: Brand: CURITY

## (undated) DEVICE — NEEDLE HYPO 25GA L1.5IN BLU POLYPR HUB S STL REG BVL STR

## (undated) DEVICE — NEEDLE HYPO 25GA L1.5IN BVL ORIENTED ECLIPSE

## (undated) DEVICE — GOWN,SIRUS,FABRNF,XL,20/CS: Brand: MEDLINE

## (undated) DEVICE — SUTURE VCRL SZ 2-0 L12X18IN ABSRB UD POLYGLACTIN 910 BRAID J911T

## (undated) DEVICE — TABLE COVER: Brand: CONVERTORS